# Patient Record
Sex: FEMALE | Race: WHITE | NOT HISPANIC OR LATINO | Employment: OTHER | URBAN - METROPOLITAN AREA
[De-identification: names, ages, dates, MRNs, and addresses within clinical notes are randomized per-mention and may not be internally consistent; named-entity substitution may affect disease eponyms.]

---

## 2023-02-22 LAB
ALANINE AMINOTRANSFERASE (SGPT) (U/L) IN SER/PLAS: 16 U/L (ref 7–45)
ALBUMIN (G/DL) IN SER/PLAS: 4.5 G/DL (ref 3.4–5)
ALKALINE PHOSPHATASE (U/L) IN SER/PLAS: 94 U/L (ref 33–136)
ANION GAP IN SER/PLAS: 12 MMOL/L (ref 10–20)
ASPARTATE AMINOTRANSFERASE (SGOT) (U/L) IN SER/PLAS: 23 U/L (ref 9–39)
BILIRUBIN TOTAL (MG/DL) IN SER/PLAS: 0.5 MG/DL (ref 0–1.2)
CALCIUM (MG/DL) IN SER/PLAS: 10.4 MG/DL (ref 8.6–10.3)
CARBON DIOXIDE, TOTAL (MMOL/L) IN SER/PLAS: 32 MMOL/L (ref 21–32)
CHLORIDE (MMOL/L) IN SER/PLAS: 99 MMOL/L (ref 98–107)
CHOLESTEROL (MG/DL) IN SER/PLAS: 163 MG/DL (ref 0–199)
CHOLESTEROL IN HDL (MG/DL) IN SER/PLAS: 66.7 MG/DL
CHOLESTEROL/HDL RATIO: 2.4
CREATININE (MG/DL) IN SER/PLAS: 0.98 MG/DL (ref 0.5–1.05)
ERYTHROCYTE DISTRIBUTION WIDTH (RATIO) BY AUTOMATED COUNT: 13.4 % (ref 11.5–14.5)
ERYTHROCYTE MEAN CORPUSCULAR HEMOGLOBIN CONCENTRATION (G/DL) BY AUTOMATED: 31.6 G/DL (ref 32–36)
ERYTHROCYTE MEAN CORPUSCULAR VOLUME (FL) BY AUTOMATED COUNT: 98 FL (ref 80–100)
ERYTHROCYTES (10*6/UL) IN BLOOD BY AUTOMATED COUNT: 4.41 X10E12/L (ref 4–5.2)
GFR FEMALE: 57 ML/MIN/1.73M2
GLUCOSE (MG/DL) IN SER/PLAS: 82 MG/DL (ref 74–99)
HEMATOCRIT (%) IN BLOOD BY AUTOMATED COUNT: 43 % (ref 36–46)
HEMOGLOBIN (G/DL) IN BLOOD: 13.6 G/DL (ref 12–16)
LDL: 80 MG/DL (ref 0–99)
LEUKOCYTES (10*3/UL) IN BLOOD BY AUTOMATED COUNT: 11.1 X10E9/L (ref 4.4–11.3)
PLATELETS (10*3/UL) IN BLOOD AUTOMATED COUNT: 343 X10E9/L (ref 150–450)
POTASSIUM (MMOL/L) IN SER/PLAS: 4.6 MMOL/L (ref 3.5–5.3)
PROTEIN TOTAL: 7.8 G/DL (ref 6.4–8.2)
SODIUM (MMOL/L) IN SER/PLAS: 138 MMOL/L (ref 136–145)
THYROTROPIN (MIU/L) IN SER/PLAS BY DETECTION LIMIT <= 0.05 MIU/L: 1.47 MIU/L (ref 0.44–3.98)
TRIGLYCERIDE (MG/DL) IN SER/PLAS: 84 MG/DL (ref 0–149)
UREA NITROGEN (MG/DL) IN SER/PLAS: 17 MG/DL (ref 6–23)
VLDL: 17 MG/DL (ref 0–40)

## 2023-02-26 PROBLEM — H02.88A MEIBOMIAN GLAND DYSFUNCTION (MGD) OF UPPER AND LOWER EYELID OF RIGHT EYE: Status: ACTIVE | Noted: 2023-02-26

## 2023-02-26 PROBLEM — Z96.1 BILATERAL PSEUDOPHAKIA: Status: ACTIVE | Noted: 2023-02-26

## 2023-02-26 PROBLEM — I10 WHITE COAT SYNDROME WITH HYPERTENSION: Status: ACTIVE | Noted: 2023-02-26

## 2023-02-26 PROBLEM — G47.19 EXCESSIVE DAYTIME SLEEPINESS: Status: ACTIVE | Noted: 2023-02-26

## 2023-02-26 PROBLEM — N18.31 ANEMIA IN STAGE 3A CHRONIC KIDNEY DISEASE (MULTI): Status: ACTIVE | Noted: 2023-02-26

## 2023-02-26 PROBLEM — D22.9 NEVUS: Status: ACTIVE | Noted: 2023-02-26

## 2023-02-26 PROBLEM — H35.371 EPIRETINAL MEMBRANE (ERM) OF RIGHT EYE: Status: ACTIVE | Noted: 2023-02-26

## 2023-02-26 PROBLEM — H90.3 BILATERAL SENSORINEURAL HEARING LOSS: Status: ACTIVE | Noted: 2023-02-26

## 2023-02-26 PROBLEM — H52.02 HYPERMETROPIA OF LEFT EYE: Status: ACTIVE | Noted: 2023-02-26

## 2023-02-26 PROBLEM — K13.70 ORAL MUCOSAL LESION: Status: ACTIVE | Noted: 2023-02-26

## 2023-02-26 PROBLEM — M85.80 OSTEOPENIA: Status: ACTIVE | Noted: 2023-02-26

## 2023-02-26 PROBLEM — D12.6 ADENOMATOUS POLYP OF COLON: Status: ACTIVE | Noted: 2023-02-26

## 2023-02-26 PROBLEM — H02.88B MEIBOMIAN GLAND DYSFUNCTION (MGD) OF UPPER AND LOWER EYELID OF LEFT EYE: Status: ACTIVE | Noted: 2023-02-26

## 2023-02-26 PROBLEM — H52.13 MYOPIA OF BOTH EYES: Status: ACTIVE | Noted: 2023-02-26

## 2023-02-26 PROBLEM — R63.4 WEIGHT LOSS: Status: ACTIVE | Noted: 2023-02-26

## 2023-02-26 PROBLEM — E03.9 HYPOTHYROIDISM: Status: ACTIVE | Noted: 2023-02-26

## 2023-02-26 PROBLEM — Z96.1 PSEUDOPHAKIA OF LEFT EYE: Status: ACTIVE | Noted: 2023-02-26

## 2023-02-26 PROBLEM — H91.91 HEARING LOSS OF RIGHT EAR: Status: ACTIVE | Noted: 2023-02-26

## 2023-02-26 PROBLEM — F41.8 DEPRESSION WITH ANXIETY: Status: ACTIVE | Noted: 2023-02-26

## 2023-02-26 PROBLEM — F17.200 NICOTINE DEPENDENCE, UNSPECIFIED, UNCOMPLICATED: Status: ACTIVE | Noted: 2023-02-26

## 2023-02-26 PROBLEM — D63.1 ANEMIA IN STAGE 3A CHRONIC KIDNEY DISEASE (MULTI): Status: ACTIVE | Noted: 2023-02-26

## 2023-02-26 PROBLEM — G31.84 MILD COGNITIVE IMPAIRMENT: Status: ACTIVE | Noted: 2023-02-26

## 2023-02-26 PROBLEM — H31.103 MYOPIC RETINOPATHY OF BOTH EYES: Status: ACTIVE | Noted: 2023-02-26

## 2023-02-26 PROBLEM — Z96.1 PSEUDOPHAKIA OF RIGHT EYE: Status: ACTIVE | Noted: 2023-02-26

## 2023-02-26 PROBLEM — H35.3131 EARLY DRY STAGE NONEXUDATIVE AGE-RELATED MACULAR DEGENERATION OF BOTH EYES: Status: ACTIVE | Noted: 2023-02-26

## 2023-02-26 RX ORDER — CALCIUM CARBONATE/VITAMIN D3 600MG-5MCG
2 TABLET ORAL DAILY
COMMUNITY
Start: 2013-03-12

## 2023-02-26 RX ORDER — LEVOTHYROXINE SODIUM 100 UG/1
100 TABLET ORAL DAILY
COMMUNITY
Start: 2013-03-12 | End: 2023-06-12

## 2023-02-26 RX ORDER — MIRTAZAPINE 30 MG/1
30 TABLET, FILM COATED ORAL NIGHTLY
COMMUNITY
End: 2023-08-28 | Stop reason: SDUPTHER

## 2023-02-26 RX ORDER — ASPIRIN 81 MG/1
81 TABLET ORAL DAILY
COMMUNITY
Start: 2013-03-12

## 2023-02-26 RX ORDER — DESOXIMETASONE 2.5 MG/G
1 OINTMENT TOPICAL 2 TIMES DAILY
COMMUNITY
Start: 2013-03-12

## 2023-03-20 ENCOUNTER — OFFICE VISIT (OUTPATIENT)
Dept: PRIMARY CARE | Facility: CLINIC | Age: 83
End: 2023-03-20
Payer: MEDICARE

## 2023-03-20 VITALS
SYSTOLIC BLOOD PRESSURE: 138 MMHG | WEIGHT: 100.4 LBS | HEART RATE: 91 BPM | TEMPERATURE: 96.9 F | OXYGEN SATURATION: 100 % | RESPIRATION RATE: 16 BRPM | DIASTOLIC BLOOD PRESSURE: 80 MMHG | BODY MASS INDEX: 16.08 KG/M2

## 2023-03-20 DIAGNOSIS — F41.8 DEPRESSION WITH ANXIETY: ICD-10-CM

## 2023-03-20 DIAGNOSIS — R63.4 WEIGHT LOSS: ICD-10-CM

## 2023-03-20 DIAGNOSIS — I10 WHITE COAT SYNDROME WITH HYPERTENSION: Primary | ICD-10-CM

## 2023-03-20 DIAGNOSIS — G31.84 MILD COGNITIVE IMPAIRMENT: ICD-10-CM

## 2023-03-20 DIAGNOSIS — N18.31 ANEMIA IN STAGE 3A CHRONIC KIDNEY DISEASE (MULTI): ICD-10-CM

## 2023-03-20 DIAGNOSIS — D63.1 ANEMIA IN STAGE 3A CHRONIC KIDNEY DISEASE (MULTI): ICD-10-CM

## 2023-03-20 DIAGNOSIS — E03.8 OTHER SPECIFIED HYPOTHYROIDISM: ICD-10-CM

## 2023-03-20 PROBLEM — G47.19 EXCESSIVE DAYTIME SLEEPINESS: Status: RESOLVED | Noted: 2023-02-26 | Resolved: 2023-03-20

## 2023-03-20 PROCEDURE — 3075F SYST BP GE 130 - 139MM HG: CPT | Performed by: INTERNAL MEDICINE

## 2023-03-20 PROCEDURE — 3079F DIAST BP 80-89 MM HG: CPT | Performed by: INTERNAL MEDICINE

## 2023-03-20 PROCEDURE — 99214 OFFICE O/P EST MOD 30 MIN: CPT | Performed by: INTERNAL MEDICINE

## 2023-03-20 PROCEDURE — 1159F MED LIST DOCD IN RCRD: CPT | Performed by: INTERNAL MEDICINE

## 2023-03-20 NOTE — PROGRESS NOTES
Subjective   Patient ID: Hafsa Wilson is a 82 y.o. female who presents for Follow-up.    Eating better  Has gained 7lbs           Review of Systems    Objective   /80   Pulse 91   Temp 36.1 °C (96.9 °F)   Resp 16   Wt 45.5 kg (100 lb 6.4 oz)   SpO2 100%   BMI 16.08 kg/m²     Physical Exam    Assessment/Plan   Problem List Items Addressed This Visit       Anemia in stage 3a chronic kidney disease     Stable         Depression with anxiety     On Mirtazapine         Hypothyroidism    Mild cognitive impairment    Weight loss     Improved         White coat syndrome with hypertension - Primary

## 2023-06-10 DIAGNOSIS — E03.9 ACQUIRED HYPOTHYROIDISM: Primary | ICD-10-CM

## 2023-06-12 RX ORDER — LEVOTHYROXINE SODIUM 100 UG/1
100 TABLET ORAL DAILY
Qty: 90 TABLET | Refills: 1 | Status: SHIPPED | OUTPATIENT
Start: 2023-06-12 | End: 2024-03-11 | Stop reason: SDUPTHER

## 2023-06-13 DIAGNOSIS — E03.9 ACQUIRED HYPOTHYROIDISM: ICD-10-CM

## 2023-06-22 ENCOUNTER — OFFICE VISIT (OUTPATIENT)
Dept: PRIMARY CARE | Facility: CLINIC | Age: 83
End: 2023-06-22
Payer: MEDICARE

## 2023-06-22 VITALS
SYSTOLIC BLOOD PRESSURE: 136 MMHG | HEART RATE: 63 BPM | DIASTOLIC BLOOD PRESSURE: 78 MMHG | WEIGHT: 98.8 LBS | TEMPERATURE: 97.6 F | BODY MASS INDEX: 15.83 KG/M2 | RESPIRATION RATE: 16 BRPM | OXYGEN SATURATION: 97 %

## 2023-06-22 DIAGNOSIS — F41.8 DEPRESSION WITH ANXIETY: ICD-10-CM

## 2023-06-22 DIAGNOSIS — E03.9 ACQUIRED HYPOTHYROIDISM: ICD-10-CM

## 2023-06-22 DIAGNOSIS — I10 WHITE COAT SYNDROME WITH HYPERTENSION: Primary | ICD-10-CM

## 2023-06-22 PROCEDURE — 1159F MED LIST DOCD IN RCRD: CPT | Performed by: INTERNAL MEDICINE

## 2023-06-22 PROCEDURE — 3075F SYST BP GE 130 - 139MM HG: CPT | Performed by: INTERNAL MEDICINE

## 2023-06-22 PROCEDURE — 3078F DIAST BP <80 MM HG: CPT | Performed by: INTERNAL MEDICINE

## 2023-06-22 PROCEDURE — 99214 OFFICE O/P EST MOD 30 MIN: CPT | Performed by: INTERNAL MEDICINE

## 2023-06-22 NOTE — PROGRESS NOTES
Subjective   Patient ID: Hafsa Wilson is a 83 y.o. female who presents for Follow-up.  Here for follow up  Feels very down daily from early afternoon to lat night  Gets up at 5am and feels good   But mid afternoon she is tired  Not going to activities in her community    Mirtazapine helps her sleep - takes it at 10pm          Review of Systems    Objective   Physical Exam    Assessment/Plan   Problem List Items Addressed This Visit       Depression with anxiety    Current Assessment & Plan     Take Mirtazaipine at 7pm  Add in a walk at 5pm  Encouraged participation in more activities           Hypothyroidism    Current Assessment & Plan     Stable         White coat syndrome with hypertension - Primary    Current Assessment & Plan     Stable         Follow up with me in 3 months

## 2023-08-28 DIAGNOSIS — F41.8 DEPRESSION WITH ANXIETY: ICD-10-CM

## 2023-08-28 RX ORDER — MIRTAZAPINE 30 MG/1
30 TABLET, FILM COATED ORAL NIGHTLY
Qty: 90 TABLET | Refills: 1 | Status: SHIPPED | OUTPATIENT
Start: 2023-08-28 | End: 2023-11-20 | Stop reason: SDUPTHER

## 2023-10-02 ENCOUNTER — OFFICE VISIT (OUTPATIENT)
Dept: PRIMARY CARE | Facility: CLINIC | Age: 83
End: 2023-10-02
Payer: MEDICARE

## 2023-10-02 VITALS
SYSTOLIC BLOOD PRESSURE: 142 MMHG | OXYGEN SATURATION: 98 % | DIASTOLIC BLOOD PRESSURE: 86 MMHG | WEIGHT: 102 LBS | TEMPERATURE: 97.8 F | RESPIRATION RATE: 16 BRPM | HEART RATE: 61 BPM | BODY MASS INDEX: 16.34 KG/M2

## 2023-10-02 DIAGNOSIS — E03.9 ACQUIRED HYPOTHYROIDISM: ICD-10-CM

## 2023-10-02 DIAGNOSIS — F41.8 DEPRESSION WITH ANXIETY: Primary | ICD-10-CM

## 2023-10-02 DIAGNOSIS — G31.84 MILD COGNITIVE IMPAIRMENT: ICD-10-CM

## 2023-10-02 DIAGNOSIS — Z23 ENCOUNTER FOR IMMUNIZATION: ICD-10-CM

## 2023-10-02 DIAGNOSIS — M70.22 OLECRANON BURSITIS OF LEFT ELBOW: ICD-10-CM

## 2023-10-02 PROCEDURE — 99214 OFFICE O/P EST MOD 30 MIN: CPT | Performed by: INTERNAL MEDICINE

## 2023-10-02 PROCEDURE — 1159F MED LIST DOCD IN RCRD: CPT | Performed by: INTERNAL MEDICINE

## 2023-10-02 PROCEDURE — 3077F SYST BP >= 140 MM HG: CPT | Performed by: INTERNAL MEDICINE

## 2023-10-02 PROCEDURE — G0008 ADMIN INFLUENZA VIRUS VAC: HCPCS | Performed by: INTERNAL MEDICINE

## 2023-10-02 PROCEDURE — 1160F RVW MEDS BY RX/DR IN RCRD: CPT | Performed by: INTERNAL MEDICINE

## 2023-10-02 PROCEDURE — 90662 IIV NO PRSV INCREASED AG IM: CPT | Performed by: INTERNAL MEDICINE

## 2023-10-02 PROCEDURE — 3079F DIAST BP 80-89 MM HG: CPT | Performed by: INTERNAL MEDICINE

## 2023-10-02 ASSESSMENT — ENCOUNTER SYMPTOMS
SHORTNESS OF BREATH: 0
COUGH: 0
ACTIVITY CHANGE: 0
PALPITATIONS: 0
APPETITE CHANGE: 0

## 2023-10-02 NOTE — ASSESSMENT & PLAN NOTE
Referred to Dr Varma  Continue Mirtazapine for now    We have agreed that twice a week she will connect with a friend and twice a week she should go to a class or activity

## 2023-10-02 NOTE — PROGRESS NOTES
"The patient is here today for a follow up appointment.Subjective   Patient ID: Hafsa Wilson is a 83 y.o. female who presents for Follow-up.    Noticed swelling over the tip of elbow    She does not think the Mirtazapine is helping enough- feels an overall glumness  \"I am tired of living\"   Not social  Not participating in the activities  Always tired          Review of Systems   Constitutional:  Negative for activity change and appetite change.   Respiratory:  Negative for cough and shortness of breath.    Cardiovascular:  Negative for chest pain, palpitations and leg swelling.       Objective   Physical Exam  Vitals and nursing note reviewed.   Cardiovascular:      Rate and Rhythm: Normal rate and regular rhythm.   Pulmonary:      Effort: Pulmonary effort is normal.      Breath sounds: Normal breath sounds.         Assessment/Plan   Problem List Items Addressed This Visit       Depression with anxiety - Primary    Current Assessment & Plan     Referred to Dr Varma  Continue Mirtazapine for now         Hypothyroidism    Current Assessment & Plan     Stable         Mild cognitive impairment     Other Visit Diagnoses       Encounter for immunization        Relevant Orders    Flu vaccine, quadrivalent, high-dose, preservative free, age 65y+ (FLUZONE) (Completed)         Follow up with me in Feb for a Wellness visit  "

## 2023-10-25 ENCOUNTER — OFFICE VISIT (OUTPATIENT)
Dept: OPHTHALMOLOGY | Facility: CLINIC | Age: 83
End: 2023-10-25
Payer: MEDICARE

## 2023-10-25 DIAGNOSIS — H35.30 ARMD (AGE-RELATED MACULAR DEGENERATION), BILATERAL: Primary | ICD-10-CM

## 2023-10-25 PROCEDURE — 92134 CPTRZ OPH DX IMG PST SGM RTA: CPT | Mod: BILATERAL PROCEDURE | Performed by: OPHTHALMOLOGY

## 2023-10-25 PROCEDURE — 99213 OFFICE O/P EST LOW 20 MIN: CPT | Performed by: OPHTHALMOLOGY

## 2023-10-25 ASSESSMENT — VISUAL ACUITY
OS_CC+: -1
METHOD: SNELLEN - LINEAR
OD_CC: 20/30
CORRECTION_TYPE: GLASSES
OS_CC: 20/30

## 2023-10-25 ASSESSMENT — REFRACTION_WEARINGRX
OS_CYLINDER: -0.50
OD_CYLINDER: -0.50
OS_ADD: +2.50
OS_SPHERE: +0.50
OS_AXIS: 070
OD_ADD: +2.50
OD_SPHERE: -1.00
OD_AXIS: 095

## 2023-10-25 ASSESSMENT — CONF VISUAL FIELD
OS_SUPERIOR_TEMPORAL_RESTRICTION: 0
OS_INFERIOR_TEMPORAL_RESTRICTION: 0
OS_NORMAL: 1
OD_SUPERIOR_TEMPORAL_RESTRICTION: 0
OD_INFERIOR_TEMPORAL_RESTRICTION: 0
OS_SUPERIOR_NASAL_RESTRICTION: 0
OD_INFERIOR_NASAL_RESTRICTION: 0
OD_SUPERIOR_NASAL_RESTRICTION: 0
OS_INFERIOR_NASAL_RESTRICTION: 0
OD_NORMAL: 1

## 2023-10-25 ASSESSMENT — ENCOUNTER SYMPTOMS
NEUROLOGICAL NEGATIVE: 0
EYES NEGATIVE: 1
PSYCHIATRIC NEGATIVE: 0
ENDOCRINE NEGATIVE: 0
RESPIRATORY NEGATIVE: 0
GASTROINTESTINAL NEGATIVE: 0
MUSCULOSKELETAL NEGATIVE: 1
CARDIOVASCULAR NEGATIVE: 0
CONSTITUTIONAL NEGATIVE: 0
ALLERGIC/IMMUNOLOGIC NEGATIVE: 0
HEMATOLOGIC/LYMPHATIC NEGATIVE: 0

## 2023-10-25 ASSESSMENT — TONOMETRY
OS_IOP_MMHG: 17
OD_IOP_MMHG: 18
IOP_METHOD: GOLDMANN APPLANATION

## 2023-10-25 NOTE — PROGRESS NOTES
Impression    1 T85.22XA Dislocated intraocular lens-Stable  2 H35.89 Macular Rpe mottling-Stable  3 H35.371 Epiretinal membrane (erm) of right eye-Stable  4 H52.223 Regular astigmatism, bilateral-Stable  5 H52.02 Hypermetropia of left eye-Stable  6 H52.4 Presbyopia-Stable  7 H52.13 Myopic retinopathy of both eyes-Stable          Discussion    Dislocated intraocular lensT85.22XA  the lens is very slightly of axis  this should be correctable lens  no surgery needed at this point         Macular RPE [id number]  Early dry stage nonexudative age-related macular degeneration of both eyesH35.3131  Mild. not vs. Discussed imporantance of sun protection outdoors, not smoking, healthy diet and exercise. Monitor.    Regular astigmatism, [id number]  Hypermetropia of left eyeH52.02       for now watch for CME  or other sec complications no surgery advised    None today

## 2023-11-13 ENCOUNTER — TELEPHONE (OUTPATIENT)
Dept: PRIMARY CARE | Facility: CLINIC | Age: 83
End: 2023-11-13
Payer: MEDICARE

## 2023-11-13 NOTE — TELEPHONE ENCOUNTER
Pt would like to talk to Dr. Weiss, she advised she is dizzy when she sits up and stands and didn't know if she possibly has vertigo. Please call

## 2023-11-13 NOTE — TELEPHONE ENCOUNTER
DW pt  C/o 2 days of dizziness  Worse when goes from sitting to standing   Not driving  Not on any BP meds  Recommend improve hydration to 32 ounces of water per day and liberalize salt in diet  Call back in 3-5 days if no better

## 2023-11-16 ENCOUNTER — TELEPHONE (OUTPATIENT)
Dept: PRIMARY CARE | Facility: CLINIC | Age: 83
End: 2023-11-16
Payer: MEDICARE

## 2023-11-16 ENCOUNTER — APPOINTMENT (OUTPATIENT)
Dept: RADIOLOGY | Facility: HOSPITAL | Age: 83
End: 2023-11-16
Payer: MEDICARE

## 2023-11-16 ENCOUNTER — HOSPITAL ENCOUNTER (EMERGENCY)
Facility: HOSPITAL | Age: 83
Discharge: HOME | End: 2023-11-16
Attending: STUDENT IN AN ORGANIZED HEALTH CARE EDUCATION/TRAINING PROGRAM
Payer: MEDICARE

## 2023-11-16 ENCOUNTER — APPOINTMENT (OUTPATIENT)
Dept: CARDIOLOGY | Facility: HOSPITAL | Age: 83
End: 2023-11-16
Payer: MEDICARE

## 2023-11-16 VITALS
HEIGHT: 66 IN | HEART RATE: 66 BPM | BODY MASS INDEX: 16.88 KG/M2 | WEIGHT: 105 LBS | SYSTOLIC BLOOD PRESSURE: 185 MMHG | TEMPERATURE: 98.4 F | RESPIRATION RATE: 18 BRPM | DIASTOLIC BLOOD PRESSURE: 76 MMHG | OXYGEN SATURATION: 98 %

## 2023-11-16 DIAGNOSIS — R42 VERTIGO: Primary | ICD-10-CM

## 2023-11-16 LAB
ALBUMIN SERPL BCP-MCNC: 4 G/DL (ref 3.4–5)
ALP SERPL-CCNC: 81 U/L (ref 33–136)
ALT SERPL W P-5'-P-CCNC: 7 U/L (ref 7–45)
ANION GAP SERPL CALC-SCNC: 10 MMOL/L (ref 10–20)
APPEARANCE UR: CLEAR
AST SERPL W P-5'-P-CCNC: 18 U/L (ref 9–39)
BACTERIA #/AREA URNS AUTO: ABNORMAL /HPF
BASOPHILS # BLD AUTO: 0.06 X10*3/UL (ref 0–0.1)
BASOPHILS NFR BLD AUTO: 0.7 %
BILIRUB SERPL-MCNC: 0.3 MG/DL (ref 0–1.2)
BILIRUB UR STRIP.AUTO-MCNC: NEGATIVE MG/DL
BUN SERPL-MCNC: 10 MG/DL (ref 6–23)
CALCIUM SERPL-MCNC: 9.4 MG/DL (ref 8.6–10.3)
CARDIAC TROPONIN I PNL SERPL HS: 6 NG/L (ref 0–13)
CARDIAC TROPONIN I PNL SERPL HS: 7 NG/L (ref 0–13)
CHLORIDE SERPL-SCNC: 103 MMOL/L (ref 98–107)
CO2 SERPL-SCNC: 28 MMOL/L (ref 21–32)
COLOR UR: YELLOW
CREAT SERPL-MCNC: 0.91 MG/DL (ref 0.5–1.05)
EOSINOPHIL # BLD AUTO: 0.2 X10*3/UL (ref 0–0.4)
EOSINOPHIL NFR BLD AUTO: 2.5 %
ERYTHROCYTE [DISTWIDTH] IN BLOOD BY AUTOMATED COUNT: 13 % (ref 11.5–14.5)
FLUAV RNA RESP QL NAA+PROBE: NOT DETECTED
FLUBV RNA RESP QL NAA+PROBE: NOT DETECTED
GFR SERPL CREATININE-BSD FRML MDRD: 63 ML/MIN/1.73M*2
GLUCOSE SERPL-MCNC: 90 MG/DL (ref 74–99)
GLUCOSE UR STRIP.AUTO-MCNC: NEGATIVE MG/DL
HCT VFR BLD AUTO: 42.1 % (ref 36–46)
HGB BLD-MCNC: 13.7 G/DL (ref 12–16)
HOLD SPECIMEN: NORMAL
IMM GRANULOCYTES # BLD AUTO: 0.03 X10*3/UL (ref 0–0.5)
IMM GRANULOCYTES NFR BLD AUTO: 0.4 % (ref 0–0.9)
KETONES UR STRIP.AUTO-MCNC: NEGATIVE MG/DL
LEUKOCYTE ESTERASE UR QL STRIP.AUTO: ABNORMAL
LYMPHOCYTES # BLD AUTO: 2.21 X10*3/UL (ref 0.8–3)
LYMPHOCYTES NFR BLD AUTO: 27.1 %
MCH RBC QN AUTO: 31.1 PG (ref 26–34)
MCHC RBC AUTO-ENTMCNC: 32.5 G/DL (ref 32–36)
MCV RBC AUTO: 96 FL (ref 80–100)
MONOCYTES # BLD AUTO: 0.66 X10*3/UL (ref 0.05–0.8)
MONOCYTES NFR BLD AUTO: 8.1 %
MUCOUS THREADS #/AREA URNS AUTO: ABNORMAL /LPF
NEUTROPHILS # BLD AUTO: 4.98 X10*3/UL (ref 1.6–5.5)
NEUTROPHILS NFR BLD AUTO: 61.2 %
NITRITE UR QL STRIP.AUTO: NEGATIVE
NRBC BLD-RTO: 0 /100 WBCS (ref 0–0)
PH UR STRIP.AUTO: 5 [PH]
PLATELET # BLD AUTO: 281 X10*3/UL (ref 150–450)
POTASSIUM SERPL-SCNC: 3.8 MMOL/L (ref 3.5–5.3)
PROT SERPL-MCNC: 7.1 G/DL (ref 6.4–8.2)
PROT UR STRIP.AUTO-MCNC: NEGATIVE MG/DL
RBC # BLD AUTO: 4.41 X10*6/UL (ref 4–5.2)
RBC # UR STRIP.AUTO: NEGATIVE /UL
RBC #/AREA URNS AUTO: ABNORMAL /HPF
SARS-COV-2 RNA RESP QL NAA+PROBE: NOT DETECTED
SODIUM SERPL-SCNC: 137 MMOL/L (ref 136–145)
SP GR UR STRIP.AUTO: 1.01
TSH SERPL-ACNC: 1.74 MIU/L (ref 0.44–3.98)
UROBILINOGEN UR STRIP.AUTO-MCNC: <2 MG/DL
WBC # BLD AUTO: 8.1 X10*3/UL (ref 4.4–11.3)
WBC #/AREA URNS AUTO: ABNORMAL /HPF

## 2023-11-16 PROCEDURE — 87086 URINE CULTURE/COLONY COUNT: CPT | Mod: ELYLAB | Performed by: STUDENT IN AN ORGANIZED HEALTH CARE EDUCATION/TRAINING PROGRAM

## 2023-11-16 PROCEDURE — 85025 COMPLETE CBC W/AUTO DIFF WBC: CPT | Performed by: STUDENT IN AN ORGANIZED HEALTH CARE EDUCATION/TRAINING PROGRAM

## 2023-11-16 PROCEDURE — 71045 X-RAY EXAM CHEST 1 VIEW: CPT | Mod: FR

## 2023-11-16 PROCEDURE — 93005 ELECTROCARDIOGRAM TRACING: CPT

## 2023-11-16 PROCEDURE — 36415 COLL VENOUS BLD VENIPUNCTURE: CPT | Performed by: STUDENT IN AN ORGANIZED HEALTH CARE EDUCATION/TRAINING PROGRAM

## 2023-11-16 PROCEDURE — 2500000001 HC RX 250 WO HCPCS SELF ADMINISTERED DRUGS (ALT 637 FOR MEDICARE OP): Performed by: STUDENT IN AN ORGANIZED HEALTH CARE EDUCATION/TRAINING PROGRAM

## 2023-11-16 PROCEDURE — 99285 EMERGENCY DEPT VISIT HI MDM: CPT | Mod: 25 | Performed by: STUDENT IN AN ORGANIZED HEALTH CARE EDUCATION/TRAINING PROGRAM

## 2023-11-16 PROCEDURE — 80053 COMPREHEN METABOLIC PANEL: CPT | Performed by: STUDENT IN AN ORGANIZED HEALTH CARE EDUCATION/TRAINING PROGRAM

## 2023-11-16 PROCEDURE — 70450 CT HEAD/BRAIN W/O DYE: CPT

## 2023-11-16 PROCEDURE — 84484 ASSAY OF TROPONIN QUANT: CPT | Performed by: STUDENT IN AN ORGANIZED HEALTH CARE EDUCATION/TRAINING PROGRAM

## 2023-11-16 PROCEDURE — 81001 URINALYSIS AUTO W/SCOPE: CPT | Performed by: STUDENT IN AN ORGANIZED HEALTH CARE EDUCATION/TRAINING PROGRAM

## 2023-11-16 PROCEDURE — 70450 CT HEAD/BRAIN W/O DYE: CPT | Performed by: RADIOLOGY

## 2023-11-16 PROCEDURE — 84443 ASSAY THYROID STIM HORMONE: CPT | Performed by: STUDENT IN AN ORGANIZED HEALTH CARE EDUCATION/TRAINING PROGRAM

## 2023-11-16 PROCEDURE — 71045 X-RAY EXAM CHEST 1 VIEW: CPT | Mod: FOREIGN READ | Performed by: RADIOLOGY

## 2023-11-16 PROCEDURE — 87636 SARSCOV2 & INF A&B AMP PRB: CPT | Performed by: STUDENT IN AN ORGANIZED HEALTH CARE EDUCATION/TRAINING PROGRAM

## 2023-11-16 PROCEDURE — 94760 N-INVAS EAR/PLS OXIMETRY 1: CPT

## 2023-11-16 RX ORDER — MECLIZINE HCL 12.5 MG 12.5 MG/1
25 TABLET ORAL 2 TIMES DAILY
Qty: 20 TABLET | Refills: 0 | Status: SHIPPED | OUTPATIENT
Start: 2023-11-16 | End: 2023-11-21

## 2023-11-16 RX ORDER — MECLIZINE HCL 12.5 MG 12.5 MG/1
25 TABLET ORAL ONCE
Status: COMPLETED | OUTPATIENT
Start: 2023-11-16 | End: 2023-11-16

## 2023-11-16 RX ADMIN — MECLIZINE HCL 12.5 MG 25 MG: 12.5 TABLET ORAL at 18:11

## 2023-11-16 ASSESSMENT — LIFESTYLE VARIABLES
REASON UNABLE TO ASSESS: NO
HAVE PEOPLE ANNOYED YOU BY CRITICIZING YOUR DRINKING: NO
EVER FELT BAD OR GUILTY ABOUT YOUR DRINKING: NO
EVER HAD A DRINK FIRST THING IN THE MORNING TO STEADY YOUR NERVES TO GET RID OF A HANGOVER: NO
HAVE YOU EVER FELT YOU SHOULD CUT DOWN ON YOUR DRINKING: NO

## 2023-11-16 ASSESSMENT — COLUMBIA-SUICIDE SEVERITY RATING SCALE - C-SSRS
2. HAVE YOU ACTUALLY HAD ANY THOUGHTS OF KILLING YOURSELF?: NO
6. HAVE YOU EVER DONE ANYTHING, STARTED TO DO ANYTHING, OR PREPARED TO DO ANYTHING TO END YOUR LIFE?: NO
1. IN THE PAST MONTH, HAVE YOU WISHED YOU WERE DEAD OR WISHED YOU COULD GO TO SLEEP AND NOT WAKE UP?: NO

## 2023-11-16 ASSESSMENT — PAIN SCALES - GENERAL
PAINLEVEL_OUTOF10: 0 - NO PAIN
PAINLEVEL_OUTOF10: 0 - NO PAIN

## 2023-11-16 ASSESSMENT — PAIN - FUNCTIONAL ASSESSMENT: PAIN_FUNCTIONAL_ASSESSMENT: 0-10

## 2023-11-16 NOTE — TELEPHONE ENCOUNTER
Pt of yours called in stating she has been feeling very dizzy/off balance and dehydrated. States she spoke with you earlier this week in regards to this. At this time her daughter is home with her and she could possibly make an appt. Please advise. Thank you!

## 2023-11-16 NOTE — ED TRIAGE NOTES
PT. ARRIVED VIA PRIVATE CAR, RIDE PROVIDED, TO ED FROM HOME FOR DIZZINESS X3DAYS. PT. STATES NOTICING DIZZINESS W/ FATIGUE X3DAYS. PT. STATES SHE CALLED PCP AND WAS ASKED TO COME TO ED. PT. STATES SHE HASN'T BEEN EATING WELL. PT. NEURO WNL, DENIES CP, SOB, N/V/D, HA, NUMBNESS/TINGLING.

## 2023-11-16 NOTE — TELEPHONE ENCOUNTER
Spoke with Hafsa and her daughter Serenity, advised them of the message and to go to an ER for the best plan of care. They stated they would go to [facility]. Serenity can be reached at [phone] and she stated she understood to call back to follow up.

## 2023-11-16 NOTE — ED PROVIDER NOTES
HPI   Chief Complaint   Patient presents with    Dizziness     X3DAYS W/ FATIGUE       Patient is a an 83-year-old female with history of CKD, hypothyroid who presents for dizziness and fatigue.  Patient states it has been going on for about 3 days.  Patient been having off-and-on dizziness, worse when she moves her head and stands up.  Denies chest pain, shortness of breath, fevers, chills, cough, nose, sore throat, vomiting, diarrhea, abdominal pain, dysuria, hematuria.  States she has been urinating more frequently which she attributes to drinking more water.  Denies any history of vertigo or similar episodes, LOC, anticoagulation use, ear pain, ringing in her ears.  States she has had decreased taste in the last several days as well, though just for coffee.                          [place] Coma Scale Score: 15         NIH Stroke Scale: 0          Patient History   Past Medical History:   Diagnosis Date    Abdominal distension (gaseous) 03/12/2013    Abdominal distention    Contusion of left lower leg, initial encounter 07/30/2015    Contusion of leg, left    Displacement of intraocular lens, initial encounter 10/17/2022    Dislocated intraocular lens    Encounter for examination of eyes and vision without abnormal findings     Encounter for ophthalmic examination and evaluation    Encounter for gynecological examination (general) (routine) without abnormal findings     Encounter for gynecological examination without abnormal finding    Encounter for screening for cardiovascular disorders     Encounter for screening for cardiovascular disorders    Epiretinal membrane (ERM) of right eye     Impacted cerumen, right ear 04/16/2021    Impacted cerumen of right ear    Ingrowing nail 04/26/2021    Ingrown toenail    Macular RPE mottling     Nail dystrophy 07/25/2018    Dystrophic nail    Other conditions influencing health status     X-Ray    Other conditions influencing health status 11/08/2013    Foot pain,  unspecified laterality    Other specified anxiety disorders     Depression with anxiety    Pain in left toe(s) 04/26/2021    Chronic pain of toe of left foot    Pain in unspecified knee     Joint pain, knee    Palpitations     Palpitations    Personal history of other diseases of the circulatory system     History of hypertension    Personal history of other diseases of the musculoskeletal system and connective tissue 09/16/2021    History of neck pain    Personal history of other diseases of the musculoskeletal system and connective tissue     History of osteopenia    Personal history of other diseases of the respiratory system 09/21/2015    History of acute bronchitis    Personal history of other endocrine, nutritional and metabolic disease     History of hypothyroidism    Personal history of other infectious and parasitic diseases     History of herpes zoster    Personal history of other infectious and parasitic diseases 04/26/2021    History of onychomycosis    Personal history of other specified conditions     History of epistaxis    Personal history of other specified conditions     History of fatigue    Personal history of other specified conditions 11/01/2018    History of breast lump    Rash and other nonspecific skin eruption 09/04/2018    Rash    Supraventricular tachycardia     Supraventricular tachycardia    Syncope and collapse 03/08/2017    Syncope and collapse    Toxic effect of venom of bees, accidental (unintentional), initial encounter 07/21/2018    Bee sting    Unspecified abnormal findings in urine     Malodorous urine     Past Surgical History:   Procedure Laterality Date    CATARACT EXTRACTION      COLONOSCOPY  12/20/2012    Complete Colonoscopy    OTHER SURGICAL HISTORY  12/20/2012    Superficial Cauterization Of Turbinate Mucosa     Family History   Problem Relation Name Age of Onset    Hyperlipidemia Mother      Macular degeneration Mother      Other (Cardiac disorder) Father      Heart  attack Father       Social History     Tobacco Use    Smoking status: Every Day     Packs/day: .25     Types: Cigarettes    Smokeless tobacco: Never   Vaping Use    Vaping Use: Never used   Substance Use Topics    Alcohol use: Yes     Comment: rare    Drug use: Never       Physical Exam   ED Triage Vitals [11/16/23 1634]   Temp Heart Rate Resp BP   36.9 °C (98.4 °F) 68 18 171/79      SpO2 Temp Source Heart Rate Source Patient Position   98 % Temporal Monitor Sitting      BP Location FiO2 (%)     Right arm --       Physical Exam  Constitutional:       General: She is not in acute distress.  HENT:      Head: Normocephalic.   Eyes:      Extraocular Movements: Extraocular movements intact.      Conjunctiva/sclera: Conjunctivae normal.      Pupils: Pupils are equal, round, and reactive to light.   Cardiovascular:      Rate and Rhythm: Normal rate and regular rhythm.      Pulses: Normal pulses.      Heart sounds: Normal heart sounds.   Pulmonary:      Effort: Pulmonary effort is normal.      Breath sounds: Normal breath sounds.   Abdominal:      General: There is no distension.      Palpations: Abdomen is soft. There is no mass.      Tenderness: There is no abdominal tenderness. There is no guarding.   Musculoskeletal:         General: No deformity.      Cervical back: Normal range of motion and neck supple.      Right lower leg: No edema.      Left lower leg: No edema.   Skin:     General: Skin is warm and dry.      Findings: No lesion or rash.   Neurological:      General: No focal deficit present.      Mental Status: She is alert and oriented to person, place, and time. Mental status is at baseline.      Cranial Nerves: No cranial nerve deficit.      Sensory: No sensory deficit.      Motor: No weakness.      Comments: CN 2-12 intact, ambulates without issue   Psychiatric:         Mood and Affect: Mood normal.         ED Course & MDM   Diagnoses as of 11/16/23 1949   Vertigo       Medical Decision Making  EKG on  interpretation: Rate 61, rhythm regular, axis rightward, , , QTc 430, T waves: Unremarkable, ST segments: No elevations or depressions, to rotation: Normal sinus rhythm, no STEMI    EKG on my interpretation: Rate 63, rhythm regular, axis rightward, , , QTc 454, T waves: Biphasic in V3, ST segments: No elevations or depressions, interpretation: Normal sinus rhythm, no STEMI    Patient is an 83-year-old female with above-stated past medical history presents for dizziness.  Patient's laboratory salts are unremarkable.  She has nonischemic EKGs and 2 negative troponins, low suspicion for ACS.  Patient has no chest pain or shortness of breath, low suspicion for pulmonary embolism as cause for her symptoms.  Patient is no electrolyte derangements.  Patient's urinalysis does not show signs of urinary tract infection.  Chest x-ray does not show signs of pneumonia or pneumothorax.  CT head did not show acute findings.  On reevaluation, patient states her symptoms are improved with meclizine.  She continues to state that her symptoms are only occurring when she moves or turns her head or stands up.  I have low clinical suspicion for central cause to her symptoms.  Patient appears clinically well and nontoxic.  I discussed the risk benefits of admission versus discharge home with meclizine with the patient and she is amenable discharge home.  I believe this is reasonable.  I gave the patient strict return precautions.  She stated understanding agreement.  Patient was discharged in stable condition.  I believe patient's symptoms are likely due to a peripheral cause given her improvement with meclizine and the description of her symptoms as well as her physical exam and work-up.    Disclaimer: This note was dictated using speech recognition software. Minor errors in transcription may be present. Please call if questions.     Julius Hennessy MD  [facility]  Contact on [facility]  [place]        Problems Addressed:  Vertigo: acute illness or injury    Amount and/or Complexity of Data Reviewed  Labs: ordered.  Radiology: ordered.  ECG/medicine tests: ordered and independent interpretation performed.        Procedure  Procedures     Julius Hennessy MD  11/16/23 1955

## 2023-11-17 LAB — BACTERIA UR CULT: NORMAL

## 2023-11-20 DIAGNOSIS — F41.8 DEPRESSION WITH ANXIETY: ICD-10-CM

## 2023-11-20 RX ORDER — MIRTAZAPINE 30 MG/1
30 TABLET, FILM COATED ORAL NIGHTLY
Qty: 90 TABLET | Refills: 1 | Status: SHIPPED | OUTPATIENT
Start: 2023-11-20 | End: 2024-02-20 | Stop reason: DRUGHIGH

## 2023-11-22 ENCOUNTER — OFFICE VISIT (OUTPATIENT)
Dept: PRIMARY CARE | Facility: CLINIC | Age: 83
End: 2023-11-22
Payer: MEDICARE

## 2023-11-22 VITALS
BODY MASS INDEX: 15.98 KG/M2 | HEART RATE: 62 BPM | WEIGHT: 99.4 LBS | DIASTOLIC BLOOD PRESSURE: 76 MMHG | SYSTOLIC BLOOD PRESSURE: 128 MMHG | OXYGEN SATURATION: 98 % | HEIGHT: 66 IN | TEMPERATURE: 98 F

## 2023-11-22 DIAGNOSIS — F41.8 DEPRESSION WITH ANXIETY: ICD-10-CM

## 2023-11-22 DIAGNOSIS — R63.4 WEIGHT LOSS: ICD-10-CM

## 2023-11-22 DIAGNOSIS — I10 WHITE COAT SYNDROME WITH HYPERTENSION: ICD-10-CM

## 2023-11-22 DIAGNOSIS — R42 VERTIGO: Primary | ICD-10-CM

## 2023-11-22 PROCEDURE — 1160F RVW MEDS BY RX/DR IN RCRD: CPT | Performed by: INTERNAL MEDICINE

## 2023-11-22 PROCEDURE — 1159F MED LIST DOCD IN RCRD: CPT | Performed by: INTERNAL MEDICINE

## 2023-11-22 PROCEDURE — 99214 OFFICE O/P EST MOD 30 MIN: CPT | Performed by: INTERNAL MEDICINE

## 2023-11-22 PROCEDURE — 1126F AMNT PAIN NOTED NONE PRSNT: CPT | Performed by: INTERNAL MEDICINE

## 2023-11-22 PROCEDURE — 3078F DIAST BP <80 MM HG: CPT | Performed by: INTERNAL MEDICINE

## 2023-11-22 PROCEDURE — 3074F SYST BP LT 130 MM HG: CPT | Performed by: INTERNAL MEDICINE

## 2023-11-22 RX ORDER — METHYLPREDNISOLONE 4 MG/1
TABLET ORAL
Qty: 21 TABLET | Refills: 0 | Status: SHIPPED | OUTPATIENT
Start: 2023-11-22 | End: 2023-12-06 | Stop reason: ALTCHOICE

## 2023-11-22 NOTE — PROGRESS NOTES
"Subjective   Patient ID: Hafsa Wilson is a 83 y.o. female who presents for ER Follow-up.    Patient presents today following up after being seen at [facility] ER on 11/16/2023.   Was feeling dizzy - \"feels faint\"- the room was tilting  Has not passed out with this  Sometimes eating helps her  Her daughter cooks for her but she eats VERY small meals      Patient had Labs, EKG, CT head and Chest xray completed during ER visit.     Patient would like to discuss the COVID and RSV vaccinations.            Review of Systems   Constitutional: Negative.    HENT: Negative.     Respiratory: Negative.     Cardiovascular: Negative.    Gastrointestinal: Negative.    Genitourinary: Negative.    Musculoskeletal: Negative.        Objective   /76 (BP Location: Left arm, Patient Position: Sitting)   Pulse 62   Temp 36.7 °C (98 °F)   Ht 1.676 m (5' 6\")   Wt 45.1 kg (99 lb 6.4 oz)   SpO2 98%   BMI 16.04 kg/m²     Physical Exam  Vitals and nursing note reviewed.   Cardiovascular:      Rate and Rhythm: Normal rate and regular rhythm.   Pulmonary:      Effort: Pulmonary effort is normal.      Breath sounds: Normal breath sounds.   Neurological:      Comments: Bilateral lateral guaze nystagmus         Assessment/Plan   Problem List Items Addressed This Visit             ICD-10-CM    Depression with anxiety F41.8    Weight loss R63.4     We have discussed increasing calorie intake at length, daughter is going to help her with this.           White coat syndrome with hypertension I10     Other Visit Diagnoses         Codes    Vertigo    -  Primary R42    Relevant Medications    methylPREDNISolone (Medrol Dospak) 4 mg tablets               "

## 2023-11-27 ASSESSMENT — ENCOUNTER SYMPTOMS
RESPIRATORY NEGATIVE: 1
MUSCULOSKELETAL NEGATIVE: 1
CONSTITUTIONAL NEGATIVE: 1
CARDIOVASCULAR NEGATIVE: 1
GASTROINTESTINAL NEGATIVE: 1

## 2023-12-06 ENCOUNTER — OFFICE VISIT (OUTPATIENT)
Dept: PRIMARY CARE | Facility: CLINIC | Age: 83
End: 2023-12-06
Payer: MEDICARE

## 2023-12-06 VITALS
HEIGHT: 66 IN | BODY MASS INDEX: 16.36 KG/M2 | DIASTOLIC BLOOD PRESSURE: 72 MMHG | OXYGEN SATURATION: 100 % | WEIGHT: 101.8 LBS | HEART RATE: 57 BPM | SYSTOLIC BLOOD PRESSURE: 160 MMHG

## 2023-12-06 DIAGNOSIS — R42 VERTIGO: ICD-10-CM

## 2023-12-06 PROCEDURE — 1126F AMNT PAIN NOTED NONE PRSNT: CPT | Performed by: INTERNAL MEDICINE

## 2023-12-06 PROCEDURE — 4004F PT TOBACCO SCREEN RCVD TLK: CPT | Performed by: INTERNAL MEDICINE

## 2023-12-06 PROCEDURE — 1159F MED LIST DOCD IN RCRD: CPT | Performed by: INTERNAL MEDICINE

## 2023-12-06 PROCEDURE — 99214 OFFICE O/P EST MOD 30 MIN: CPT | Performed by: INTERNAL MEDICINE

## 2023-12-06 PROCEDURE — 3077F SYST BP >= 140 MM HG: CPT | Performed by: INTERNAL MEDICINE

## 2023-12-06 PROCEDURE — 1160F RVW MEDS BY RX/DR IN RCRD: CPT | Performed by: INTERNAL MEDICINE

## 2023-12-06 PROCEDURE — 3078F DIAST BP <80 MM HG: CPT | Performed by: INTERNAL MEDICINE

## 2023-12-06 ASSESSMENT — ENCOUNTER SYMPTOMS
PALPITATIONS: 0
COUGH: 0
APPETITE CHANGE: 0
SHORTNESS OF BREATH: 0
ACTIVITY CHANGE: 0

## 2023-12-06 ASSESSMENT — PATIENT HEALTH QUESTIONNAIRE - PHQ9
SUM OF ALL RESPONSES TO PHQ9 QUESTIONS 1 AND 2: 0
1. LITTLE INTEREST OR PLEASURE IN DOING THINGS: NOT AT ALL
2. FEELING DOWN, DEPRESSED OR HOPELESS: NOT AT ALL

## 2023-12-06 NOTE — PROGRESS NOTES
"Subjective   Patient ID: Hafsa Wilson is a 83 y.o. female who presents for 2 week follow up and Vertigo.    HPI   Patient presents today following up with vertigo. Patient was given medrol dose pack at last visit. Patient states she finished prescription and does not notice any improvement. Patient states she is still experiencing dizziness and believes it is worse with certain head positioning.   Finished steroids    Has gained 2 lbs    Review of Systems   Constitutional:  Negative for activity change and appetite change.   Respiratory:  Negative for cough and shortness of breath.    Cardiovascular:  Negative for chest pain, palpitations and leg swelling.       Objective   /72 (BP Location: Left arm, Patient Position: Sitting)   Pulse 57   Ht 1.676 m (5' 6\")   Wt 46.2 kg (101 lb 12.8 oz)   SpO2 100%   BMI 16.43 kg/m²     Physical Exam  Vitals and nursing note reviewed.   Cardiovascular:      Rate and Rhythm: Normal rate and regular rhythm.   Pulmonary:      Effort: Pulmonary effort is normal.      Breath sounds: Normal breath sounds.         Assessment/Plan   Problem List Items Addressed This Visit    None  Visit Diagnoses         Codes    Vertigo     R42    Relevant Orders    Referral to Physical Therapy          Weight loss- monitor;    Follow up with me as scheduled     "

## 2023-12-07 ENCOUNTER — OFFICE VISIT (OUTPATIENT)
Dept: BEHAVIORAL HEALTH | Facility: CLINIC | Age: 83
End: 2023-12-07
Payer: MEDICARE

## 2023-12-07 VITALS
WEIGHT: 100 LBS | SYSTOLIC BLOOD PRESSURE: 152 MMHG | BODY MASS INDEX: 16.07 KG/M2 | HEART RATE: 72 BPM | HEIGHT: 66 IN | DIASTOLIC BLOOD PRESSURE: 63 MMHG

## 2023-12-07 DIAGNOSIS — F41.8 DEPRESSION WITH ANXIETY: ICD-10-CM

## 2023-12-07 PROCEDURE — 1126F AMNT PAIN NOTED NONE PRSNT: CPT | Performed by: PSYCHIATRY & NEUROLOGY

## 2023-12-07 PROCEDURE — 3078F DIAST BP <80 MM HG: CPT | Performed by: PSYCHIATRY & NEUROLOGY

## 2023-12-07 PROCEDURE — 4004F PT TOBACCO SCREEN RCVD TLK: CPT | Performed by: PSYCHIATRY & NEUROLOGY

## 2023-12-07 PROCEDURE — 1159F MED LIST DOCD IN RCRD: CPT | Performed by: PSYCHIATRY & NEUROLOGY

## 2023-12-07 PROCEDURE — 99204 OFFICE O/P NEW MOD 45 MIN: CPT | Performed by: PSYCHIATRY & NEUROLOGY

## 2023-12-07 PROCEDURE — 3077F SYST BP >= 140 MM HG: CPT | Performed by: PSYCHIATRY & NEUROLOGY

## 2023-12-07 PROCEDURE — 1160F RVW MEDS BY RX/DR IN RCRD: CPT | Performed by: PSYCHIATRY & NEUROLOGY

## 2023-12-07 RX ORDER — SERTRALINE HYDROCHLORIDE 50 MG/1
50 TABLET, FILM COATED ORAL DAILY
Qty: 30 TABLET | Refills: 1 | Status: SHIPPED | OUTPATIENT
Start: 2023-12-07 | End: 2024-04-23 | Stop reason: ALTCHOICE

## 2023-12-07 ASSESSMENT — ENCOUNTER SYMPTOMS
DECREASED CONCENTRATION: 1
HYPERACTIVE: 0
FATIGUE: 1
NERVOUS/ANXIOUS: 0
CONFUSION: 0
APPETITE CHANGE: 0
AGITATION: 0
HALLUCINATIONS: 0
SLEEP DISTURBANCE: 1
DYSPHORIC MOOD: 0
LIGHT-HEADEDNESS: 1

## 2023-12-07 NOTE — PROGRESS NOTES
Subjective   Patient ID: Hafsa Wilson is a 83 y.o. female who presents for depression   84 y/o female referred by PCP for depression   On Mirtazapine 30 mg, by PCP   Moved to [facility] community a year ago  Feels depressed, hypersomnia, hopeless, depression has been getting worse.   Reported no change in her appetite although she is under weight, smokes 5 cigarettes a day   Was in ED on 11/16/23, does not remember that she was   Head CT: Hypoattenuation of periventricular and subcortical white matter  suggestive of chronic small vessel ischemic disease. Mild diffuse  parenchymal volume loss is noted  No assistance needed in instrumental or basic activities of daily living   Records indicated she was on Bupropion for sometime. Weight has always been on the low side, for instance BMI was 16.93 gibran 2018         Past Psychiatric History:  No past inpatient admissions, no history of suicide or self harm   Substance Abuse History:  None  Family History:  Family History   Problem Relation Name Age of Onset    Hyperlipidemia Mother      Macular degeneration Mother      Other (Cardiac disorder) Father      Heart attack Father      Unaware of family history of mental illness   Social History:  Social History     Socioeconomic History    Marital status: Single     Spouse name: Not on file    Number of children: Not on file    Years of education: Not on file    Highest education level: Not on file   Occupational History    Not on file   Tobacco Use    Smoking status: Every Day     Packs/day: .25     Types: Cigarettes    Smokeless tobacco: Never   Vaping Use    Vaping Use: Never used   Substance and Sexual Activity    Alcohol use: Yes     Comment: rare    Drug use: Never    Sexual activity: Not on file   Other Topics Concern    Not on file   Social History Narrative    Not on file     Social Determinants of Health     Financial Resource Strain: Not on file   Food Insecurity: Not on file   Transportation Needs: Not on file    Physical Activity: Not on file   Stress: Not on file   Social Connections: Not on file   Intimate Partner Violence: Not on file   Housing Stability: Not on file        Born and raised in [place], one brother, raised by her parents,  for 15 years then , no history of trauma, has 3 children   Worked as a teacher        Review of Systems   Constitutional:  Positive for fatigue. Negative for appetite change.   Musculoskeletal:  Negative for gait problem.   Neurological:  Positive for light-headedness.   Psychiatric/Behavioral:  Positive for decreased concentration and sleep disturbance. Negative for agitation, behavioral problems, confusion, dysphoric mood, hallucinations, self-injury and suicidal ideas. The patient is not nervous/anxious and is not hyperactive.        Objective   Vitals:    12/07/23 1321   BP: 152/63   Pulse: 72      Physical Exam  Psychiatric:         Attention and Perception: Attention and perception normal.         Mood and Affect: Mood is depressed.         Speech: Speech normal.         Behavior: Behavior normal. Behavior is cooperative.         Thought Content: Thought content normal.         Cognition and Memory: She exhibits impaired remote memory.         Lab Review:   Admission on 11/16/2023, Discharged on 11/16/2023   Component Date Value    WBC 11/16/2023 8.1     nRBC 11/16/2023 0.0     RBC 11/16/2023 4.41     Hemoglobin 11/16/2023 13.7     Hematocrit 11/16/2023 42.1     MCV 11/16/2023 96     MCH 11/16/2023 31.1     MCHC 11/16/2023 32.5     RDW 11/16/2023 13.0     Platelets 11/16/2023 281     Neutrophils % 11/16/2023 61.2     Immature Granulocytes %,* 11/16/2023 0.4     Lymphocytes % 11/16/2023 27.1     Monocytes % 11/16/2023 8.1     Eosinophils % 11/16/2023 2.5     Basophils % 11/16/2023 0.7     Neutrophils Absolute 11/16/2023 4.98     Immature Granulocytes Ab* 11/16/2023 0.03     Lymphocytes Absolute 11/16/2023 2.21     Monocytes Absolute 11/16/2023 0.66      Eosinophils Absolute 11/16/2023 0.20     Basophils Absolute 11/16/2023 0.06     Glucose 11/16/2023 90     Sodium 11/16/2023 137     Potassium 11/16/2023 3.8     Chloride 11/16/2023 103     Bicarbonate 11/16/2023 28     Anion Gap 11/16/2023 10     Urea Nitrogen 11/16/2023 10     Creatinine 11/16/2023 0.91     eGFR 11/16/2023 63     Calcium 11/16/2023 9.4     Albumin 11/16/2023 4.0     Alkaline Phosphatase 11/16/2023 81     Total Protein 11/16/2023 7.1     AST 11/16/2023 18     Bilirubin, Total 11/16/2023 0.3     ALT 11/16/2023 7     Troponin I, High Sensiti* 11/16/2023 6     Troponin I, High Sensiti* 11/16/2023 7     Coronavirus 2019, PCR 11/16/2023 Not Detected     Flu A Result 11/16/2023 Not Detected     Flu B Result 11/16/2023 Not Detected     Thyroid Stimulating Horm* 11/16/2023 1.74     Color, Urine 11/16/2023 Yellow     Appearance, Urine 11/16/2023 Clear     Specific Gravity, Urine 11/16/2023 1.011     pH, Urine 11/16/2023 5.0     Protein, Urine 11/16/2023 NEGATIVE     Glucose, Urine 11/16/2023 NEGATIVE     Blood, Urine 11/16/2023 NEGATIVE     Ketones, Urine 11/16/2023 NEGATIVE     Bilirubin, Urine 11/16/2023 NEGATIVE     Urobilinogen, Urine 11/16/2023 <2.0     Nitrite, Urine 11/16/2023 NEGATIVE     Leukocyte Esterase, Urine 11/16/2023 TRACE (A)     Extra Tube 11/16/2023 Hold for add-ons.     WBC, Urine 11/16/2023 1-5     RBC, Urine 11/16/2023 NONE     Bacteria, Urine 11/16/2023 1+ (A)     Mucus, Urine 11/16/2023 1+     Urine Culture 11/16/2023 No significant growth      Lab Results   Component Value Date     11/16/2023     02/22/2023     04/18/2022     02/13/2021    K 3.8 11/16/2023    K 4.6 02/22/2023    K 4.0 04/18/2022    K 4.2 02/13/2021    CO2 28 11/16/2023    CO2 32 02/22/2023    CO2 33 (H) 04/18/2022    CO2 31 02/13/2021    BUN 10 11/16/2023    BUN 17 02/22/2023    BUN 16 04/18/2022    BUN 21 02/13/2021    CREATININE 0.91 11/16/2023    CREATININE 0.98 02/22/2023    CREATININE  1.16 (H) 04/18/2022    CREATININE 1.15 (H) 02/13/2021    GLUCOSE 90 11/16/2023    GLUCOSE 82 02/22/2023    GLUCOSE 86 04/18/2022    GLUCOSE 75 02/13/2021    CALCIUM 9.4 11/16/2023    CALCIUM 10.4 (H) 02/22/2023    CALCIUM 10.1 04/18/2022    CALCIUM 10.0 02/13/2021     Lab Results   Component Value Date    WBC 8.1 11/16/2023    HGB 13.7 11/16/2023    HCT 42.1 11/16/2023    MCV 96 11/16/2023     11/16/2023     Lab Results   Component Value Date    CHOL 163 02/22/2023    TRIG 84 02/22/2023    HDL 66.7 02/22/2023       Assessment/Plan   Problem List Items Addressed This Visit       Depression with anxiety     Continue Mirtazapine 30 mg po daily, a trial Sertraline, 50 mg po daily with a plan to follow up with PCP for her high blood pressure, we may consider a trial of SNRI later   Follow up with me in 1 month or sooner if needed    Zunilda Daniels MD

## 2024-01-04 ENCOUNTER — CLINICAL SUPPORT (OUTPATIENT)
Dept: PHYSICAL THERAPY | Facility: CLINIC | Age: 84
End: 2024-01-04
Payer: MEDICARE

## 2024-01-04 DIAGNOSIS — G89.29 NECK PAIN, CHRONIC: ICD-10-CM

## 2024-01-04 DIAGNOSIS — R42 VERTIGO: Primary | ICD-10-CM

## 2024-01-04 DIAGNOSIS — M54.2 NECK PAIN, CHRONIC: ICD-10-CM

## 2024-01-04 DIAGNOSIS — R29.898 DECREASED ROM OF NECK: ICD-10-CM

## 2024-01-04 PROCEDURE — 97112 NEUROMUSCULAR REEDUCATION: CPT | Mod: GP

## 2024-01-04 PROCEDURE — 97162 PT EVAL MOD COMPLEX 30 MIN: CPT | Mod: GP

## 2024-01-04 ASSESSMENT — ENCOUNTER SYMPTOMS
LOSS OF SENSATION IN FEET: 0
DEPRESSION: 0
OCCASIONAL FEELINGS OF UNSTEADINESS: 1

## 2024-01-04 NOTE — PROGRESS NOTES
Physical Therapy Initial Evaluation:  Vestibular and Balance    ** NOTE:  Patient was over 15 minutes late to scheduled appointment.    Patient Name:  Hafsa Wilson   MRN:  [id number]   Date of Evaluation:  01/04/24   Time Calculation  Start Time: 1101  Stop Time: 1155  Time Calculation (min): 54 min  Visit Number:  1  Insurance Information:  2023 4% COINS, 150 DED, 1500 OOP MAX, VS MED NEC, 1 EVAL  DAYS, NO AUTH     1. Vertigo  Referral to Physical Therapy    Follow Up In Physical Therapy      2. Neck pain, chronic        3. Decreased ROM of neck            Assessment: Hafsa Wilson is a 83 year old female referred to outpatient PT for dizziness and imbalance.  Patient's subjective limited as she had difficulty describing symptoms and presentation.  Patient shows signs of imbalance with position changes, especially with rising (whether that be from supine or from sitting).  Patient's neck ROM is significantly impaired to which it limits potentially the effectiveness and accuracy of positional testing and treatment.  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  However, the effectiveness is yet to be known.  Patient was educated upon this and demonstrated understanding.  Based on patient's examination, concurrent co-morbidities, and presentation, patient's level of complexity is Moderate.  As a result, recommending continued PT services to facilitate improvement in CLOF.    Problems include:  Balance, Decreased functional level, Decreased knowledge of HEP, Dizziness / vertigo, Fall risk, Gait / locomotion, and ROM    Goals:  By Discharge patient will demo:  [place] in HEP  No falls during POC  DHI improvement by 18% to match MDC  Negative positional testing    Potential to achieve rehab goals is fair.    Plan:  1 times every 2-3 weeks for a total of 6 PRN visits.  Re-assessment after that time.    Activities that may be performed include but are not limited to:   "balance, gait, transfers, modalities (as appropriate), e-stim (as appropriate), canalith repositioning maneuvers, therapeutic exercise, therapeutic activities.    Hafsa Wilson in agreement with and understanding of all discussed this date.    ----------------------------------  ----------------------------------    Subjective:    Onset Date:  End of November    HPI:  Hafsa Wilson is a 83 year old female referred to outpatient PT for dizziness.  Feels like is dizzy all the time.  It is the worst when gets up in the morning.  Goes slowly now.  It's a vague feeling.  Unsure exactly how to describe it.  Doesn't feel like the room is spinning.  Feels somewhat unsteady.  Rolling over sometimes wakes her up.  \"These aren't huge, painful problems.\"  Unable to tie anything to it.  \"It comes whenever it wants and then it leaves.\"  Son-in-law looked up exercises / maneuvers for her to do at home, and those didn't seem to help.  No falls in the past 6 months.    Patient Goal:  \"see if you can do anything for me\"     (note:  \"y\" = yes; \"n\" = no)    Hx of:  - Anxiety suggested that is \"uptight\"  - Headaches / migraines (photo/phonophobia) very seldom  - Concussion n  - CVA n  - Neck pain \"terrible\" -- demoing profound loss of ROM in all direction except for flexion  - TMD n  - Motion sensitivity (car, boat) n  - Sinus infections n  - Ear infections n  - Heart conditions (afib, HTN, OH) n  - DM n  - Autoimmune disorders n  - Thyroid disorders y  - Alcohol use n  - Caffeine use 1-3 cups per day    Hearing:  - Loss (sudden or gradual) gradual  - Tinnitus n  - Audiogram n  - Autophony n    Eye Conditions: n    Vestibular Tests:   - VNG n  - VEMP n    Imaging:  - CT n  - MRI n    Precautions: increased risk of falls    [place] Fall Risk  One or more falls in the last year? No  How many Times?    Was the patient injured in the fall?    Has trouble stepping onto curb? Yes  Advised to use a cane or walker to get around safely? " "No  Often has to rush to toilet? No  Feels unsteady when walking? Yes  Has lost some feeling in feet? No  Often feels sad or depressed? No  Steadies self on furniture while walking at home? Yes  Takes medicine that makes them feel lightheaded or more tired than usual? No  Worried about Falling? Yes  Takes medicine to sleep or improve mood? Yes  Needs to push with hands when rising from a chair? Yes      Pain:  complaints of chronic neck discomfort    ----------------------------------  ----------------------------------    OBJECTIVE    Observation:  occasional episodes of multidirectional LOB upon rising to feet    ROM: significant (>50%) loss of ROM in all directions except for flexion    Strength: no gross abnormalities noted with functional mobility    Coordination: no gross abnormalities noted with functional mobility    Vestibular: (“g” = goggles, \"p\" = positive, \"n\" = negative, \"nt\" = not tested)  Occulomotor -  - ROM: n  - Smooth Pursuit:  saccadic  - Horizontal Saccades:  n  - Vertical Saccades:  n  - Eye Alignment:  n  - Static Visual Acuity: glasses  - Cover:  n  - Uncover:  n  - Cross Cover:  n  - Skew Deviation:  n  - Convergence:  n    Vestibular-Specific:  - Spontaneous Nystagmus: n  - Gaze Evoked Nystagmus:  n  - Horizontal VOR: n  - Vertical VOR:  n  - R Head Thrust:  n (limited d/t neck ROM restrictions)  - L Head Thrust:  n (limited d/t neck ROM restrictions)  - DVA:  nt  - Head Shake Nystagmus (Horizontal): nt  - Head Shake Nystagmus (Vertical):  nt    Positional Testing (note:  patient's ROM does not allow for most necessary positioning for these tests.  Modified in such a way to allow for testing as tolerable / possible.):  - R DHP: Positive upbeating with R torsion, < 30 seconds.  Patient does not report any dizziness in this position, however.  - R HRT:  n  - R Sidelying Test:  nt  - L DHP: n  - L HRT:  n  - L Sidelying Test: nt      Functional Mobility Assessment:  - Gait: some path " deviations noted with head rotation  - Transfers:  some sway noted upon   - Bed Mobility:  dizziness noted upon rising    Outcomes:  DHI:  30%      TREATMENT:  Neuromuscular Re-education (64425): 10 minutes  CRM performed to affected side x 1.  Educated extensively on BPPV and vestibular system.  Given handouts for testing / treating BPPV at home and information about the condition.  Told not to treat at home unless someone else is there.  Educated on precautions to consider (laying on affected side, laying flat, positional changes, quick movements).  Educated on how the patient can know if condition is resolved.

## 2024-01-10 ENCOUNTER — APPOINTMENT (OUTPATIENT)
Dept: PRIMARY CARE | Facility: CLINIC | Age: 84
End: 2024-01-10
Payer: MEDICARE

## 2024-01-18 ENCOUNTER — TREATMENT (OUTPATIENT)
Dept: PHYSICAL THERAPY | Facility: CLINIC | Age: 84
End: 2024-01-18
Payer: MEDICARE

## 2024-01-18 DIAGNOSIS — G89.29 NECK PAIN, CHRONIC: ICD-10-CM

## 2024-01-18 DIAGNOSIS — R29.898 DECREASED ROM OF NECK: ICD-10-CM

## 2024-01-18 DIAGNOSIS — R42 VERTIGO: Primary | ICD-10-CM

## 2024-01-18 DIAGNOSIS — M54.2 NECK PAIN, CHRONIC: ICD-10-CM

## 2024-01-18 PROCEDURE — 97530 THERAPEUTIC ACTIVITIES: CPT | Mod: GP

## 2024-01-18 NOTE — PROGRESS NOTES
"Physical Therapy Treatment      Patient Name: Hafsa Wilson  MRN: [id number]  Today's Date: 1/18/2024  Visit #: 2  Insurance: 2023 4% COINS, 150 DED, 1500 OOP MAX, VS MED NEC, 1 EVAL  DAYS, NO AUTH    Time Calculation  Start Time: 1047  Stop Time: 1126  Time Calculation (min): 39 min    1. Vertigo  Follow Up In Physical Therapy      2. Neck pain, chronic        3. Decreased ROM of neck            ASSESSMENT:  Patient demoing a resolution of symptoms and nystagmus.  Educated extensively on BPPV and proper procedures for Epley.  Encouraged to contact this therapist PRN.    GOALS:  By Discharge patient will demo:  [place] in HEP  No falls during POC  DHI improvement by 18% to match MDC  Negative positional testing  ^^ met all the above, although not given DHI this date.    PLAN:  Check in with patient on 1/30, PRN.  Give DHI if returns.    SUBJECTIVE:  Reports feeling much better since last saw this therapist.  At this point isnt having this feeling other than \"every once in a while.\"  Mind seems to be clearer.  Still notices it a little bit in the morning and afternoon when get up.  Sometimes notices it if moves too quickly.  Unsure if has been doing exercises well.    OBJECTIVE:    TREATMENT:    Therapeutic Activity (18949): 39 minutes    R DHP:  n  L DHP:  n    Orthostatic Hypotension Screen:  Position Supine Standing   Method Auto Aut   Placement L arm  L arm   BP Measure (mmHg) 196/89 198/83   HR   (Bpm) 67 65     Discussed BP with patient and potential to get further investigated, although this machine often reads elevated.    Discussed at length BPPV and procedure for R Epley.  Demonstrates understanding.  "

## 2024-01-23 ENCOUNTER — APPOINTMENT (OUTPATIENT)
Dept: PHYSICAL THERAPY | Facility: CLINIC | Age: 84
End: 2024-01-23
Payer: MEDICARE

## 2024-01-26 PROBLEM — Z86.39 HISTORY OF HYPOTHYROIDISM: Status: ACTIVE | Noted: 2024-01-26

## 2024-01-26 PROBLEM — N63.0 MASS OF BREAST: Status: ACTIVE | Noted: 2024-01-26

## 2024-01-26 PROBLEM — F41.9 ANXIETY: Status: ACTIVE | Noted: 2023-02-26

## 2024-01-26 PROBLEM — F09 MILD COGNITIVE DISORDER: Status: ACTIVE | Noted: 2024-01-26

## 2024-01-26 PROBLEM — R00.2 PALPITATIONS: Status: ACTIVE | Noted: 2024-01-26

## 2024-01-26 PROBLEM — Z86.79 HISTORY OF HYPERTENSION: Status: ACTIVE | Noted: 2024-01-26

## 2024-01-26 PROBLEM — I47.10 SUPRAVENTRICULAR TACHYCARDIA (CMS-HCC): Status: ACTIVE | Noted: 2024-01-26

## 2024-01-26 PROBLEM — R42 DIZZINESS AND GIDDINESS: Status: ACTIVE | Noted: 2024-01-26

## 2024-01-26 PROBLEM — H52.10 MYOPIA: Status: ACTIVE | Noted: 2023-02-26

## 2024-01-26 PROBLEM — F17.200 SMOKES TOBACCO DAILY: Status: ACTIVE | Noted: 2021-08-04

## 2024-01-26 PROBLEM — M70.22 OLECRANON BURSITIS OF LEFT ELBOW: Status: ACTIVE | Noted: 2024-01-26

## 2024-01-26 PROBLEM — B35.1 ONYCHOMYCOSIS: Status: ACTIVE | Noted: 2024-01-26

## 2024-01-26 PROBLEM — H61.20 IMPACTED CERUMEN: Status: ACTIVE | Noted: 2024-01-26

## 2024-01-29 ENCOUNTER — HOSPITAL ENCOUNTER (OUTPATIENT)
Dept: RADIOLOGY | Facility: CLINIC | Age: 84
Discharge: HOME | End: 2024-01-29
Payer: MEDICARE

## 2024-01-29 ENCOUNTER — OFFICE VISIT (OUTPATIENT)
Dept: PRIMARY CARE | Facility: CLINIC | Age: 84
End: 2024-01-29
Payer: MEDICARE

## 2024-01-29 VITALS
HEART RATE: 71 BPM | BODY MASS INDEX: 16.07 KG/M2 | OXYGEN SATURATION: 100 % | SYSTOLIC BLOOD PRESSURE: 147 MMHG | WEIGHT: 100 LBS | HEIGHT: 66 IN | DIASTOLIC BLOOD PRESSURE: 81 MMHG | TEMPERATURE: 96.9 F

## 2024-01-29 DIAGNOSIS — T14.90XA TRAUMA: ICD-10-CM

## 2024-01-29 DIAGNOSIS — N18.31 ANEMIA IN STAGE 3A CHRONIC KIDNEY DISEASE (MULTI): ICD-10-CM

## 2024-01-29 DIAGNOSIS — F41.8 DEPRESSION WITH ANXIETY: ICD-10-CM

## 2024-01-29 DIAGNOSIS — Z00.00 ROUTINE GENERAL MEDICAL EXAMINATION AT HEALTH CARE FACILITY: Primary | ICD-10-CM

## 2024-01-29 DIAGNOSIS — D63.1 ANEMIA IN STAGE 3A CHRONIC KIDNEY DISEASE (MULTI): ICD-10-CM

## 2024-01-29 DIAGNOSIS — Z12.31 ENCOUNTER FOR SCREENING MAMMOGRAM FOR BREAST CANCER: ICD-10-CM

## 2024-01-29 PROCEDURE — 1123F ACP DISCUSS/DSCN MKR DOCD: CPT | Performed by: INTERNAL MEDICINE

## 2024-01-29 PROCEDURE — 1158F ADVNC CARE PLAN TLK DOCD: CPT | Performed by: INTERNAL MEDICINE

## 2024-01-29 PROCEDURE — 72125 CT NECK SPINE W/O DYE: CPT

## 2024-01-29 PROCEDURE — G0439 PPPS, SUBSEQ VISIT: HCPCS | Performed by: INTERNAL MEDICINE

## 2024-01-29 PROCEDURE — 1126F AMNT PAIN NOTED NONE PRSNT: CPT | Performed by: INTERNAL MEDICINE

## 2024-01-29 PROCEDURE — 3079F DIAST BP 80-89 MM HG: CPT | Performed by: INTERNAL MEDICINE

## 2024-01-29 PROCEDURE — 70450 CT HEAD/BRAIN W/O DYE: CPT

## 2024-01-29 PROCEDURE — 1170F FXNL STATUS ASSESSED: CPT | Performed by: INTERNAL MEDICINE

## 2024-01-29 PROCEDURE — 1157F ADVNC CARE PLAN IN RCRD: CPT | Performed by: INTERNAL MEDICINE

## 2024-01-29 PROCEDURE — 3077F SYST BP >= 140 MM HG: CPT | Performed by: INTERNAL MEDICINE

## 2024-01-29 ASSESSMENT — ENCOUNTER SYMPTOMS
BLOOD IN STOOL: 0
PALPITATIONS: 0
DIFFICULTY URINATING: 0
NAUSEA: 0
TREMORS: 0
TROUBLE SWALLOWING: 0
UNEXPECTED WEIGHT CHANGE: 0
LIGHT-HEADEDNESS: 0
ACTIVITY CHANGE: 0
HEADACHES: 0
CHEST TIGHTNESS: 0
DIARRHEA: 0
COUGH: 0
FATIGUE: 1
WHEEZING: 0
DIZZINESS: 0
APPETITE CHANGE: 0
VOICE CHANGE: 0
ARTHRALGIAS: 0
CONSTIPATION: 0
HEMATURIA: 0
VOMITING: 0

## 2024-01-29 ASSESSMENT — ACTIVITIES OF DAILY LIVING (ADL)
MANAGING_FINANCES: INDEPENDENT
BATHING: INDEPENDENT
DRESSING: INDEPENDENT
GROCERY_SHOPPING: INDEPENDENT
TAKING_MEDICATION: INDEPENDENT
DOING_HOUSEWORK: INDEPENDENT

## 2024-01-29 NOTE — PROGRESS NOTES
Subjective   Patient ID: Hafsa Wilson is a 83 y.o. female who presents for Medicare Annual Wellness Visit Subsequent.    HPI   Patient is here for Medicare Annual visit, patient was fall on the back of the head last week , she wants to discuss that.    Review of Systems    Objective   There were no vitals taken for this visit.    Physical Exam    Assessment/Plan

## 2024-01-29 NOTE — PROGRESS NOTES
"Subjective   Reason for Visit: Hafsa Wilson is an 83 y.o. female here for a Medicare Wellness visit.               84 yo here for AMWV  1 week ago she fell in her garage - the automatic light turned off while she was in there - hit the back of her neck  Was able to get up on her own once she was able to find something to grab on to  Some bruising on her arms and legs  No LOC    Has continued neck pain    Saw psychiatry 12/7 - started on Sertraline - did not \"feel well on it\" Stopped the sertraline and did not go nto 1/11/24 appointment  Depression continues to worsen  Continues the Mirtazapine since she feels well on it              Patient Care Team:  Alie Weiss MD as PCP - General  Alie Weiss MD as PCP - [employer] PCP     Review of Systems   Constitutional:  Positive for fatigue. Negative for activity change, appetite change and unexpected weight change.   HENT:  Negative for ear pain, hearing loss, tinnitus, trouble swallowing and voice change.    Eyes:  Negative for visual disturbance.   Respiratory:  Negative for cough, chest tightness and wheezing.    Cardiovascular:  Negative for chest pain, palpitations and leg swelling.   Gastrointestinal:  Negative for blood in stool, constipation, diarrhea, nausea and vomiting.   Genitourinary:  Negative for difficulty urinating, hematuria and vaginal bleeding.   Musculoskeletal:  Negative for arthralgias.   Skin:  Negative for rash.   Neurological:  Negative for dizziness, tremors, syncope, light-headedness and headaches.       Objective   Vitals:  /81   Pulse 71   Temp 36.1 °C (96.9 °F)   Ht 1.676 m (5' 6\")   Wt 45.4 kg (100 lb)   SpO2 100%   BMI 16.14 kg/m²       Physical Exam  Constitutional:       General: She is not in acute distress.     Appearance: She is well-developed. She is not diaphoretic.   HENT:      Head: Normocephalic.      Right Ear: Tympanic membrane normal. There is no impacted cerumen.      Left Ear: " Tympanic membrane normal. There is no impacted cerumen.      Nose: Nose normal.      Mouth/Throat:      Mouth: Mucous membranes are moist.      Pharynx: Oropharynx is clear. No oropharyngeal exudate or posterior oropharyngeal erythema.   Eyes:      General: No scleral icterus.     Extraocular Movements: Extraocular movements intact.      Conjunctiva/sclera: Conjunctivae normal.      Pupils: Pupils are equal, round, and reactive to light.   Neck:      Thyroid: No thyromegaly.      Vascular: No JVD.   Cardiovascular:      Rate and Rhythm: Normal rate and regular rhythm.      Pulses: Normal pulses.      Heart sounds: Normal heart sounds. No murmur heard.     No friction rub. No gallop.   Pulmonary:      Effort: Pulmonary effort is normal. No respiratory distress.      Breath sounds: Normal breath sounds. No wheezing or rales.   Chest:      Chest wall: No tenderness.   Abdominal:      General: Bowel sounds are normal. There is no distension.      Palpations: Abdomen is soft. There is no mass.      Tenderness: There is no abdominal tenderness. There is no rebound.   Musculoskeletal:         General: Normal range of motion.      Cervical back: Normal range of motion and neck supple.   Lymphadenopathy:      Cervical: No cervical adenopathy.   Skin:     General: Skin is warm and dry.   Neurological:      General: No focal deficit present.      Mental Status: She is alert and oriented to person, place, and time.      Deep Tendon Reflexes: Reflexes normal.   Psychiatric:         Mood and Affect: Mood normal.         Thought Content: Thought content normal.       Neck - 3cm ecchymosis on cervical spine - tender to palp  Assessment/Plan   Problem List Items Addressed This Visit       Anemia in stage 3a chronic kidney disease (CMS/HCC)    Current Assessment & Plan     Stable         Depression with anxiety    Current Assessment & Plan     Encouraged to reschedule with psych         Relevant Orders    3 Month Follow Up In  [facility] - PCP     Other Visit Diagnoses       Routine general medical examination at health care facility    -  Primary    Relevant Orders    1 Year Follow Up In [facility] - PCP - Wellness Exam    Trauma        Recommend CT  If negative will have her use heat and tylenol    Relevant Orders    CT head wo IV contrast (Completed)    CT cervical spine wo IV contrast (Completed)    Encounter for screening mammogram for breast cancer        Relevant Orders    BI mammo bilateral screening tomosynthesis        Follow up with me in 3-4 months    Add - CT negative - rec pt use heat and tylenol and call back in 1 week if no better

## 2024-01-30 ENCOUNTER — TREATMENT (OUTPATIENT)
Dept: PHYSICAL THERAPY | Facility: CLINIC | Age: 84
End: 2024-01-30
Payer: MEDICARE

## 2024-01-30 DIAGNOSIS — G89.29 NECK PAIN, CHRONIC: ICD-10-CM

## 2024-01-30 DIAGNOSIS — R29.898 DECREASED ROM OF NECK: ICD-10-CM

## 2024-01-30 DIAGNOSIS — M54.2 NECK PAIN, CHRONIC: ICD-10-CM

## 2024-01-30 DIAGNOSIS — R42 VERTIGO: Primary | ICD-10-CM

## 2024-01-30 PROCEDURE — 97530 THERAPEUTIC ACTIVITIES: CPT | Mod: GP

## 2024-01-30 NOTE — PROGRESS NOTES
"Physical Therapy Treatment      Patient Name: Hafsa Wilson  MRN: [id number]  Today's Date: 1/30/2024  Visit #: 3  Insurance: 2023 4% COINS, 150 DED, 1500 OOP MAX, VS MED NEC, 1 EVAL  DAYS, NO AUTH    Time Calculation  Start Time: 1218  Stop Time: 1246  Time Calculation (min): 28 min    1. Vertigo  Follow Up In Physical Therapy      2. Neck pain, chronic        3. Decreased ROM of neck              ASSESSMENT:  Patient given Romberg EC for home.  Encouraged to reach out to this therapist as needed.  No further needs otherwise.    GOALS:  By Discharge patient will demo:  [place] in HEP  No falls during POC  DHI improvement by 18% to match MDC  Negative positional testing  ^^ met all the above previously, although fell because of moving in the dark and tripped / fell because couldn't see anything.    PLAN:  DC at this time.    SUBJECTIVE:  Been doing well over the last few weeks.  Only had one episode.  A little bit of a \"fuzzy\" feeling every so often.  Had a \"nasty\" fall this past Wednesday when was in the garage, the light went out and couldn't see anything to find out where she was.  Because of this she lost balance and fell and hit the back of head on concrete.  Got a head and neck CT which came back negative.    OBJECTIVE:    TREATMENT:    Therapeutic Activity (28317): 28 minutes  Discussed CLOF and reliance on vision.  Discussed performing Romberg EC in Corner with Chair in front 3 sets x 60 seconds daily.  Discussed need to perform Epley only when has episodes of vertigo.  Discussed benefits of heat vs. Ice and making rice heat pack.  "

## 2024-01-31 PROBLEM — N63.0 MASS OF BREAST: Status: RESOLVED | Noted: 2024-01-26 | Resolved: 2024-01-31

## 2024-02-02 ENCOUNTER — HOSPITAL ENCOUNTER (OUTPATIENT)
Dept: RADIOLOGY | Facility: CLINIC | Age: 84
Discharge: HOME | End: 2024-02-02
Payer: MEDICARE

## 2024-02-02 VITALS — HEIGHT: 66 IN | WEIGHT: 100.09 LBS | BODY MASS INDEX: 16.09 KG/M2

## 2024-02-02 DIAGNOSIS — Z12.31 ENCOUNTER FOR SCREENING MAMMOGRAM FOR BREAST CANCER: ICD-10-CM

## 2024-02-02 PROCEDURE — 77067 SCR MAMMO BI INCL CAD: CPT | Performed by: STUDENT IN AN ORGANIZED HEALTH CARE EDUCATION/TRAINING PROGRAM

## 2024-02-02 PROCEDURE — 77063 BREAST TOMOSYNTHESIS BI: CPT | Performed by: STUDENT IN AN ORGANIZED HEALTH CARE EDUCATION/TRAINING PROGRAM

## 2024-02-02 PROCEDURE — 77067 SCR MAMMO BI INCL CAD: CPT

## 2024-02-20 ENCOUNTER — OFFICE VISIT (OUTPATIENT)
Dept: BEHAVIORAL HEALTH | Facility: CLINIC | Age: 84
End: 2024-02-20
Payer: MEDICARE

## 2024-02-20 ENCOUNTER — LAB (OUTPATIENT)
Dept: LAB | Facility: LAB | Age: 84
End: 2024-02-20
Payer: MEDICARE

## 2024-02-20 VITALS
BODY MASS INDEX: 16.66 KG/M2 | HEIGHT: 65 IN | WEIGHT: 100 LBS | DIASTOLIC BLOOD PRESSURE: 105 MMHG | HEART RATE: 69 BPM | SYSTOLIC BLOOD PRESSURE: 174 MMHG

## 2024-02-20 DIAGNOSIS — F09 MILD COGNITIVE DISORDER: ICD-10-CM

## 2024-02-20 DIAGNOSIS — F41.8 DEPRESSION WITH ANXIETY: ICD-10-CM

## 2024-02-20 LAB — VIT B12 SERPL-MCNC: 225 PG/ML (ref 211–911)

## 2024-02-20 PROCEDURE — 3077F SYST BP >= 140 MM HG: CPT | Performed by: PSYCHIATRY & NEUROLOGY

## 2024-02-20 PROCEDURE — 86780 TREPONEMA PALLIDUM: CPT

## 2024-02-20 PROCEDURE — 1159F MED LIST DOCD IN RCRD: CPT | Performed by: PSYCHIATRY & NEUROLOGY

## 2024-02-20 PROCEDURE — 1160F RVW MEDS BY RX/DR IN RCRD: CPT | Performed by: PSYCHIATRY & NEUROLOGY

## 2024-02-20 PROCEDURE — 36415 COLL VENOUS BLD VENIPUNCTURE: CPT

## 2024-02-20 PROCEDURE — 1123F ACP DISCUSS/DSCN MKR DOCD: CPT | Performed by: PSYCHIATRY & NEUROLOGY

## 2024-02-20 PROCEDURE — 82607 VITAMIN B-12: CPT

## 2024-02-20 PROCEDURE — 1157F ADVNC CARE PLAN IN RCRD: CPT | Performed by: PSYCHIATRY & NEUROLOGY

## 2024-02-20 PROCEDURE — 1126F AMNT PAIN NOTED NONE PRSNT: CPT | Performed by: PSYCHIATRY & NEUROLOGY

## 2024-02-20 PROCEDURE — 99214 OFFICE O/P EST MOD 30 MIN: CPT | Performed by: PSYCHIATRY & NEUROLOGY

## 2024-02-20 PROCEDURE — 3080F DIAST BP >= 90 MM HG: CPT | Performed by: PSYCHIATRY & NEUROLOGY

## 2024-02-20 RX ORDER — MIRTAZAPINE 45 MG/1
45 TABLET, FILM COATED ORAL NIGHTLY
Qty: 30 TABLET | Refills: 2 | Status: SHIPPED | OUTPATIENT
Start: 2024-02-20 | End: 2024-04-23 | Stop reason: SDUPTHER

## 2024-02-20 ASSESSMENT — ENCOUNTER SYMPTOMS
CONFUSION: 0
AGITATION: 0
NERVOUS/ANXIOUS: 1
SEIZURES: 0
DECREASED CONCENTRATION: 1
HALLUCINATIONS: 0
SLEEP DISTURBANCE: 0
TREMORS: 1
HYPERACTIVE: 0
DYSPHORIC MOOD: 0

## 2024-02-20 NOTE — PROGRESS NOTES
Subjective   Patient ID: Hafsa Wilson is a 83 y.o. female who presents for follow up  We started Zoloft last giuliano, ad continued Remeron and recommended to follow up with PCP for elevated blood pressure.   Expressed her sadness about her friend`s current situation, she might be dying.   Could not tolerate Zoloft, took it for about a week. Felt her motivation is lower, however, she already feels low energy. Basically, no change from last giuliano, no thoughts of suicide.   Memory is declining, noticed if she gets anxious, she does not think clearly   MOCA:   Visuospatial: 3/5  Namin/3  Attention:   Language: 3/3  Abstraction:   Delayed recall:   Orientation:      No issues with daily activities of daily living         Review of Systems   Musculoskeletal:  Negative for gait problem.   Neurological:  Positive for tremors. Negative for seizures.   Psychiatric/Behavioral:  Positive for decreased concentration. Negative for agitation, behavioral problems, confusion, dysphoric mood, hallucinations, self-injury, sleep disturbance and suicidal ideas. The patient is nervous/anxious. The patient is not hyperactive.        Objective   Physical Exam  Psychiatric:         Attention and Perception: Attention and perception normal.         Mood and Affect: Mood is anxious and depressed.         Speech: Speech normal.         Behavior: Behavior normal. Behavior is cooperative.         Thought Content: Thought content normal.         Cognition and Memory: Cognition is impaired.         Judgment: Judgment normal.       Vitals:    24 1430   BP: (!) 174/105   Pulse: 69      Admission on 2023, Discharged on 2023   Component Date Value    WBC 2023 8.1     nRBC 2023 0.0     RBC 2023 4.41     Hemoglobin 2023 13.7     Hematocrit 2023 42.1     MCV 2023 96     MCH 2023 31.1     MCHC 2023 32.5     RDW 2023 13.0     Platelets 2023 281     Neutrophils  % 11/16/2023 61.2     Immature Granulocytes %,* 11/16/2023 0.4     Lymphocytes % 11/16/2023 27.1     Monocytes % 11/16/2023 8.1     Eosinophils % 11/16/2023 2.5     Basophils % 11/16/2023 0.7     Neutrophils Absolute 11/16/2023 4.98     Immature Granulocytes Ab* 11/16/2023 0.03     Lymphocytes Absolute 11/16/2023 2.21     Monocytes Absolute 11/16/2023 0.66     Eosinophils Absolute 11/16/2023 0.20     Basophils Absolute 11/16/2023 0.06     Glucose 11/16/2023 90     Sodium 11/16/2023 137     Potassium 11/16/2023 3.8     Chloride 11/16/2023 103     Bicarbonate 11/16/2023 28     Anion Gap 11/16/2023 10     Urea Nitrogen 11/16/2023 10     Creatinine 11/16/2023 0.91     eGFR 11/16/2023 63     Calcium 11/16/2023 9.4     Albumin 11/16/2023 4.0     Alkaline Phosphatase 11/16/2023 81     Total Protein 11/16/2023 7.1     AST 11/16/2023 18     Bilirubin, Total 11/16/2023 0.3     ALT 11/16/2023 7     Troponin I, High Sensiti* 11/16/2023 6     Troponin I, High Sensiti* 11/16/2023 7     Coronavirus 2019, PCR 11/16/2023 Not Detected     Flu A Result 11/16/2023 Not Detected     Flu B Result 11/16/2023 Not Detected     Thyroid Stimulating Horm* 11/16/2023 1.74     Color, Urine 11/16/2023 Yellow     Appearance, Urine 11/16/2023 Clear     Specific Gravity, Urine 11/16/2023 1.011     pH, Urine 11/16/2023 5.0     Protein, Urine 11/16/2023 NEGATIVE     Glucose, Urine 11/16/2023 NEGATIVE     Blood, Urine 11/16/2023 NEGATIVE     Ketones, Urine 11/16/2023 NEGATIVE     Bilirubin, Urine 11/16/2023 NEGATIVE     Urobilinogen, Urine 11/16/2023 <2.0     Nitrite, Urine 11/16/2023 NEGATIVE     Leukocyte Esterase, Urine 11/16/2023 TRACE (A)     Extra Tube 11/16/2023 Hold for add-ons.     WBC, Urine 11/16/2023 1-5     RBC, Urine 11/16/2023 NONE     Bacteria, Urine 11/16/2023 1+ (A)     Mucus, Urine 11/16/2023 1+     Urine Culture 11/16/2023 No significant growth      Lab Results   Component Value Date     11/16/2023     02/22/2023    NA  138 04/18/2022     02/13/2021    K 3.8 11/16/2023    K 4.6 02/22/2023    K 4.0 04/18/2022    K 4.2 02/13/2021    CO2 28 11/16/2023    CO2 32 02/22/2023    CO2 33 (H) 04/18/2022    CO2 31 02/13/2021    BUN 10 11/16/2023    BUN 17 02/22/2023    BUN 16 04/18/2022    BUN 21 02/13/2021    CREATININE 0.91 11/16/2023    CREATININE 0.98 02/22/2023    CREATININE 1.16 (H) 04/18/2022    CREATININE 1.15 (H) 02/13/2021    GLUCOSE 90 11/16/2023    GLUCOSE 82 02/22/2023    GLUCOSE 86 04/18/2022    GLUCOSE 75 02/13/2021    CALCIUM 9.4 11/16/2023    CALCIUM 10.4 (H) 02/22/2023    CALCIUM 10.1 04/18/2022    CALCIUM 10.0 02/13/2021     Lab Results   Component Value Date    WBC 8.1 11/16/2023    HGB 13.7 11/16/2023    HCT 42.1 11/16/2023    MCV 96 11/16/2023     11/16/2023     Lab Results   Component Value Date    CHOL 163 02/22/2023    TRIG 84 02/22/2023    HDL 66.7 02/22/2023     Assessment/Plan   Problem List Items Addressed This Visit             ICD-10-CM    Depression with anxiety F41.8    Mild cognitive disorder F09     Will complete cognitive d/o work up, order B12 and RPR  Increase Mirtazapine to 45 mg po at bedtime   Encouraged to follow up with PCP for elevated blood pressure   Follow up with me in 1-2 months or sooner if needed

## 2024-02-21 LAB — TREPONEMA PALLIDUM IGG+IGM AB [PRESENCE] IN SERUM OR PLASMA BY IMMUNOASSAY: NONREACTIVE

## 2024-03-11 DIAGNOSIS — E03.9 ACQUIRED HYPOTHYROIDISM: ICD-10-CM

## 2024-03-11 RX ORDER — LEVOTHYROXINE SODIUM 100 UG/1
100 TABLET ORAL DAILY
Qty: 90 TABLET | Refills: 1 | Status: SHIPPED | OUTPATIENT
Start: 2024-03-11

## 2024-04-23 ENCOUNTER — OFFICE VISIT (OUTPATIENT)
Dept: BEHAVIORAL HEALTH | Facility: CLINIC | Age: 84
End: 2024-04-23
Payer: MEDICARE

## 2024-04-23 VITALS
HEIGHT: 67 IN | BODY MASS INDEX: 15.22 KG/M2 | SYSTOLIC BLOOD PRESSURE: 149 MMHG | WEIGHT: 97 LBS | HEART RATE: 73 BPM | DIASTOLIC BLOOD PRESSURE: 115 MMHG

## 2024-04-23 DIAGNOSIS — F09 MILD COGNITIVE DISORDER: ICD-10-CM

## 2024-04-23 DIAGNOSIS — F41.8 DEPRESSION WITH ANXIETY: ICD-10-CM

## 2024-04-23 PROCEDURE — 3077F SYST BP >= 140 MM HG: CPT | Performed by: PSYCHIATRY & NEUROLOGY

## 2024-04-23 PROCEDURE — 1160F RVW MEDS BY RX/DR IN RCRD: CPT | Performed by: PSYCHIATRY & NEUROLOGY

## 2024-04-23 PROCEDURE — 99214 OFFICE O/P EST MOD 30 MIN: CPT | Performed by: PSYCHIATRY & NEUROLOGY

## 2024-04-23 PROCEDURE — 3080F DIAST BP >= 90 MM HG: CPT | Performed by: PSYCHIATRY & NEUROLOGY

## 2024-04-23 PROCEDURE — 1123F ACP DISCUSS/DSCN MKR DOCD: CPT | Performed by: PSYCHIATRY & NEUROLOGY

## 2024-04-23 PROCEDURE — 1157F ADVNC CARE PLAN IN RCRD: CPT | Performed by: PSYCHIATRY & NEUROLOGY

## 2024-04-23 PROCEDURE — 1159F MED LIST DOCD IN RCRD: CPT | Performed by: PSYCHIATRY & NEUROLOGY

## 2024-04-23 RX ORDER — MIRTAZAPINE 45 MG/1
45 TABLET, FILM COATED ORAL NIGHTLY
Qty: 30 TABLET | Refills: 3 | Status: SHIPPED | OUTPATIENT
Start: 2024-04-23 | End: 2024-08-21

## 2024-04-23 ASSESSMENT — ENCOUNTER SYMPTOMS
TREMORS: 1
DYSPHORIC MOOD: 0
NAUSEA: 0
HALLUCINATIONS: 0
SLEEP DISTURBANCE: 0
DIARRHEA: 0
AGITATION: 0
CONFUSION: 0
HYPERACTIVE: 0
DECREASED CONCENTRATION: 1
VOMITING: 0
SEIZURES: 0
NERVOUS/ANXIOUS: 1

## 2024-04-23 NOTE — PROGRESS NOTES
"Subjective   Patient ID: Hafsa Wilson is a 84 y.o. female who presents for follow up   Anxious about [place] trip  Sleep is ok,   Demential work up is normal, no abnormalities.   Being around people seems to help with her mood   Misplacing things, memory is \" slipping\". [facility] last giuliano 21/30   No change in activities of daily living   Zoloft, and Wellbutrin were not previously tolerated           Review of Systems   Gastrointestinal:  Negative for diarrhea, nausea and vomiting.   Musculoskeletal:  Negative for gait problem.   Neurological:  Positive for tremors. Negative for seizures.        Not resting, does not seem Parkinsonian. Not interested in neurology referral    Psychiatric/Behavioral:  Positive for decreased concentration. Negative for agitation, behavioral problems, confusion, dysphoric mood, hallucinations, self-injury, sleep disturbance and suicidal ideas. The patient is nervous/anxious. The patient is not hyperactive.        Objective   Physical Exam  Psychiatric:         Attention and Perception: Attention and perception normal.         Mood and Affect: Mood is anxious and depressed.         Speech: Speech normal.         Behavior: Behavior normal. Behavior is cooperative.         Thought Content: Thought content normal.         Cognition and Memory: Cognition is impaired.       Vitals:    04/23/24 1110   BP: (!) 149/115   Pulse: 73      Lab on 02/20/2024   Component Date Value    Vitamin B12 02/20/2024 225     Syphilis Total Ab 02/20/2024 Nonreactive    Admission on 11/16/2023, Discharged on 11/16/2023   Component Date Value    WBC 11/16/2023 8.1     nRBC 11/16/2023 0.0     RBC 11/16/2023 4.41     Hemoglobin 11/16/2023 13.7     Hematocrit 11/16/2023 42.1     MCV 11/16/2023 96     MCH 11/16/2023 31.1     MCHC 11/16/2023 32.5     RDW 11/16/2023 13.0     Platelets 11/16/2023 281     Neutrophils % 11/16/2023 61.2     Immature Granulocytes %,* 11/16/2023 0.4     Lymphocytes % 11/16/2023 27.1     Monocytes % " 11/16/2023 8.1     Eosinophils % 11/16/2023 2.5     Basophils % 11/16/2023 0.7     Neutrophils Absolute 11/16/2023 4.98     Immature Granulocytes Ab* 11/16/2023 0.03     Lymphocytes Absolute 11/16/2023 2.21     Monocytes Absolute 11/16/2023 0.66     Eosinophils Absolute 11/16/2023 0.20     Basophils Absolute 11/16/2023 0.06     Glucose 11/16/2023 90     Sodium 11/16/2023 137     Potassium 11/16/2023 3.8     Chloride 11/16/2023 103     Bicarbonate 11/16/2023 28     Anion Gap 11/16/2023 10     Urea Nitrogen 11/16/2023 10     Creatinine 11/16/2023 0.91     eGFR 11/16/2023 63     Calcium 11/16/2023 9.4     Albumin 11/16/2023 4.0     Alkaline Phosphatase 11/16/2023 81     Total Protein 11/16/2023 7.1     AST 11/16/2023 18     Bilirubin, Total 11/16/2023 0.3     ALT 11/16/2023 7     Troponin I, High Sensiti* 11/16/2023 6     Troponin I, High Sensiti* 11/16/2023 7     Coronavirus 2019, PCR 11/16/2023 Not Detected     Flu A Result 11/16/2023 Not Detected     Flu B Result 11/16/2023 Not Detected     Thyroid Stimulating Horm* 11/16/2023 1.74     Color, Urine 11/16/2023 Yellow     Appearance, Urine 11/16/2023 Clear     Specific Gravity, Urine 11/16/2023 1.011     pH, Urine 11/16/2023 5.0     Protein, Urine 11/16/2023 NEGATIVE     Glucose, Urine 11/16/2023 NEGATIVE     Blood, Urine 11/16/2023 NEGATIVE     Ketones, Urine 11/16/2023 NEGATIVE     Bilirubin, Urine 11/16/2023 NEGATIVE     Urobilinogen, Urine 11/16/2023 <2.0     Nitrite, Urine 11/16/2023 NEGATIVE     Leukocyte Esterase, Urine 11/16/2023 TRACE (A)     Extra Tube 11/16/2023 Hold for add-ons.     WBC, Urine 11/16/2023 1-5     RBC, Urine 11/16/2023 NONE     Bacteria, Urine 11/16/2023 1+ (A)     Mucus, Urine 11/16/2023 1+     Urine Culture 11/16/2023 No significant growth      Lab Results   Component Value Date     11/16/2023     02/22/2023     04/18/2022     02/13/2021    K 3.8 11/16/2023    K 4.6 02/22/2023    K 4.0 04/18/2022    K 4.2 02/13/2021     CO2 28 11/16/2023    CO2 32 02/22/2023    CO2 33 (H) 04/18/2022    CO2 31 02/13/2021    BUN 10 11/16/2023    BUN 17 02/22/2023    BUN 16 04/18/2022    BUN 21 02/13/2021    CREATININE 0.91 11/16/2023    CREATININE 0.98 02/22/2023    CREATININE 1.16 (H) 04/18/2022    CREATININE 1.15 (H) 02/13/2021    GLUCOSE 90 11/16/2023    GLUCOSE 82 02/22/2023    GLUCOSE 86 04/18/2022    GLUCOSE 75 02/13/2021    CALCIUM 9.4 11/16/2023    CALCIUM 10.4 (H) 02/22/2023    CALCIUM 10.1 04/18/2022    CALCIUM 10.0 02/13/2021     Lab Results   Component Value Date    WBC 8.1 11/16/2023    HGB 13.7 11/16/2023    HCT 42.1 11/16/2023    MCV 96 11/16/2023     11/16/2023     Lab Results   Component Value Date    CHOL 163 02/22/2023    TRIG 84 02/22/2023    HDL 66.7 02/22/2023     Assessment/Plan   Problem List Items Addressed This Visit             ICD-10-CM    Depression with anxiety F41.8    Mild cognitive disorder F09   May benefit from SNRI for depression, due her elevated blood pressure, we want it to be fixed before further adjustment on her meds   Order neuropsychological testing    Follow up with PCP for elevated blood pressure   Follow up with me in 3-4 months or sooner if needed   Continue Mirtazapine 45 mg po at bedtime

## 2024-04-24 ENCOUNTER — TELEPHONE (OUTPATIENT)
Dept: PRIMARY CARE | Facility: CLINIC | Age: 84
End: 2024-04-24

## 2024-04-24 ENCOUNTER — OFFICE VISIT (OUTPATIENT)
Dept: PRIMARY CARE | Facility: CLINIC | Age: 84
End: 2024-04-24
Payer: MEDICARE

## 2024-04-24 VITALS
BODY MASS INDEX: 15.73 KG/M2 | HEIGHT: 67 IN | SYSTOLIC BLOOD PRESSURE: 168 MMHG | OXYGEN SATURATION: 96 % | TEMPERATURE: 96.8 F | WEIGHT: 100.2 LBS | HEART RATE: 68 BPM | DIASTOLIC BLOOD PRESSURE: 83 MMHG

## 2024-04-24 DIAGNOSIS — I10 PRIMARY HYPERTENSION: Primary | ICD-10-CM

## 2024-04-24 DIAGNOSIS — E03.9 ACQUIRED HYPOTHYROIDISM: ICD-10-CM

## 2024-04-24 DIAGNOSIS — F41.8 DEPRESSION WITH ANXIETY: ICD-10-CM

## 2024-04-24 PROBLEM — T14.90XA TRAUMATIC INJURY: Status: ACTIVE | Noted: 2024-01-29

## 2024-04-24 PROBLEM — Z86.39 HISTORY OF HYPOTHYROIDISM: Status: RESOLVED | Noted: 2024-01-26 | Resolved: 2024-04-24

## 2024-04-24 PROBLEM — Z86.79 HISTORY OF HYPERTENSION: Status: RESOLVED | Noted: 2024-01-26 | Resolved: 2024-04-24

## 2024-04-24 PROCEDURE — 99214 OFFICE O/P EST MOD 30 MIN: CPT | Performed by: INTERNAL MEDICINE

## 2024-04-24 PROCEDURE — 1123F ACP DISCUSS/DSCN MKR DOCD: CPT | Performed by: INTERNAL MEDICINE

## 2024-04-24 PROCEDURE — 1160F RVW MEDS BY RX/DR IN RCRD: CPT | Performed by: INTERNAL MEDICINE

## 2024-04-24 PROCEDURE — G2211 COMPLEX E/M VISIT ADD ON: HCPCS | Performed by: INTERNAL MEDICINE

## 2024-04-24 PROCEDURE — 3077F SYST BP >= 140 MM HG: CPT | Performed by: INTERNAL MEDICINE

## 2024-04-24 PROCEDURE — 3079F DIAST BP 80-89 MM HG: CPT | Performed by: INTERNAL MEDICINE

## 2024-04-24 PROCEDURE — 1157F ADVNC CARE PLAN IN RCRD: CPT | Performed by: INTERNAL MEDICINE

## 2024-04-24 PROCEDURE — 1159F MED LIST DOCD IN RCRD: CPT | Performed by: INTERNAL MEDICINE

## 2024-04-24 RX ORDER — LISINOPRIL 10 MG/1
10 TABLET ORAL DAILY
Qty: 100 TABLET | Refills: 3 | Status: SHIPPED | OUTPATIENT
Start: 2024-04-24 | End: 2025-05-29

## 2024-04-24 ASSESSMENT — PATIENT HEALTH QUESTIONNAIRE - PHQ9
2. FEELING DOWN, DEPRESSED OR HOPELESS: NOT AT ALL
SUM OF ALL RESPONSES TO PHQ9 QUESTIONS 1 AND 2: 0
1. LITTLE INTEREST OR PLEASURE IN DOING THINGS: NOT AT ALL

## 2024-04-24 ASSESSMENT — ENCOUNTER SYMPTOMS
APPETITE CHANGE: 0
ACTIVITY CHANGE: 0
SHORTNESS OF BREATH: 0
PALPITATIONS: 0
COUGH: 0

## 2024-04-24 NOTE — TELEPHONE ENCOUNTER
Pt saw her psychiatrist yesterday and was advised she has high bp. She was advised to talk to her PCP. Pt reluctantly scheduled an appt for tomorrow at 2:30. She preferred to talk to Dr. Weiss to get medication. Advised pt we would need to check her bp. Please call pt and advise if Dr. Weiss can just talk to patient or should she keep her appointment. Pt advised she is going out of town this weekend

## 2024-04-24 NOTE — PROGRESS NOTES
Subjective   Patient ID: Hafsa Wilson is a 84 y.o. female who presents for Hypertension.  Here for followup  Was in psychiatrists office yesterday and BP was elevated - rec she contact us  Her BP has been up the last fw visits and she has been resistant to starting medications      Review of Systems   Constitutional:  Negative for activity change and appetite change.   Respiratory:  Negative for cough and shortness of breath.    Cardiovascular:  Negative for chest pain, palpitations and leg swelling.       Objective   Vitals:    04/24/24 1448   BP: 168/83   Pulse: 68   Temp: 36 °C (96.8 °F)   SpO2: 96%     Physical Exam  Vitals and nursing note reviewed.   Cardiovascular:      Rate and Rhythm: Normal rate and regular rhythm.   Pulmonary:      Effort: Pulmonary effort is normal.      Breath sounds: Normal breath sounds.       Assessment/Plan   Problem List Items Addressed This Visit       Depression with anxiety    Current Assessment & Plan     Stable         Hypothyroidism    Primary hypertension - Primary    Current Assessment & Plan     Start Lisinopril 10mg daily  Side effects discussed         Relevant Medications    lisinopril 10 mg tablet    Follow up with me as scheduled

## 2024-04-25 ENCOUNTER — APPOINTMENT (OUTPATIENT)
Dept: PRIMARY CARE | Facility: CLINIC | Age: 84
End: 2024-04-25
Payer: MEDICARE

## 2024-04-29 ENCOUNTER — APPOINTMENT (OUTPATIENT)
Dept: OPHTHALMOLOGY | Facility: CLINIC | Age: 84
End: 2024-04-29
Payer: MEDICARE

## 2024-05-01 ENCOUNTER — APPOINTMENT (OUTPATIENT)
Dept: PRIMARY CARE | Facility: CLINIC | Age: 84
End: 2024-05-01
Payer: MEDICARE

## 2024-05-14 ENCOUNTER — OFFICE VISIT (OUTPATIENT)
Dept: PRIMARY CARE | Facility: CLINIC | Age: 84
End: 2024-05-14
Payer: MEDICARE

## 2024-05-14 VITALS
HEART RATE: 66 BPM | BODY MASS INDEX: 15.51 KG/M2 | RESPIRATION RATE: 16 BRPM | SYSTOLIC BLOOD PRESSURE: 138 MMHG | OXYGEN SATURATION: 99 % | TEMPERATURE: 97.1 F | WEIGHT: 99 LBS | DIASTOLIC BLOOD PRESSURE: 78 MMHG

## 2024-05-14 DIAGNOSIS — F41.8 DEPRESSION WITH ANXIETY: ICD-10-CM

## 2024-05-14 DIAGNOSIS — I10 PRIMARY HYPERTENSION: Primary | ICD-10-CM

## 2024-05-14 PROCEDURE — 99214 OFFICE O/P EST MOD 30 MIN: CPT | Performed by: INTERNAL MEDICINE

## 2024-05-14 PROCEDURE — 3078F DIAST BP <80 MM HG: CPT | Performed by: INTERNAL MEDICINE

## 2024-05-14 PROCEDURE — 1159F MED LIST DOCD IN RCRD: CPT | Performed by: INTERNAL MEDICINE

## 2024-05-14 PROCEDURE — 3075F SYST BP GE 130 - 139MM HG: CPT | Performed by: INTERNAL MEDICINE

## 2024-05-14 PROCEDURE — 1157F ADVNC CARE PLAN IN RCRD: CPT | Performed by: INTERNAL MEDICINE

## 2024-05-14 PROCEDURE — 1160F RVW MEDS BY RX/DR IN RCRD: CPT | Performed by: INTERNAL MEDICINE

## 2024-05-14 PROCEDURE — 1123F ACP DISCUSS/DSCN MKR DOCD: CPT | Performed by: INTERNAL MEDICINE

## 2024-05-14 ASSESSMENT — ENCOUNTER SYMPTOMS
SHORTNESS OF BREATH: 0
COUGH: 0
PALPITATIONS: 0
APPETITE CHANGE: 0
ACTIVITY CHANGE: 0

## 2024-05-14 NOTE — PROGRESS NOTES
Subjective   Patient ID: Hafsa Wilson is a 84 y.o. female who presents for Follow-up (Patient here for a 3 month follow up).  Here for followup  Feeling well  Went to [place] with her son to watch her grandson play baseball  When she was gone was feeling much better - not depressed; was tired when returned    Taking lisinopril without side effects      Review of Systems   Constitutional:  Negative for activity change and appetite change.   Respiratory:  Negative for cough and shortness of breath.    Cardiovascular:  Negative for chest pain, palpitations and leg swelling.       Objective   Vitals:    05/14/24 0859   BP: 138/78   Pulse:    Resp:    Temp:    SpO2:      Physical Exam  Vitals and nursing note reviewed.   Cardiovascular:      Rate and Rhythm: Normal rate and regular rhythm.   Pulmonary:      Effort: Pulmonary effort is normal.      Breath sounds: Normal breath sounds.       Assessment/Plan   Problem List Items Addressed This Visit       Depression with anxiety    Current Assessment & Plan     Discussed more socialization  Consider a volunteer position - maybe a [employer]           Primary hypertension - Primary    Current Assessment & Plan     Improved         Follow up with me in 3 months

## 2024-05-20 ENCOUNTER — APPOINTMENT (OUTPATIENT)
Dept: PRIMARY CARE | Facility: CLINIC | Age: 84
End: 2024-05-20
Payer: MEDICARE

## 2024-06-17 ENCOUNTER — APPOINTMENT (OUTPATIENT)
Dept: OPHTHALMOLOGY | Facility: CLINIC | Age: 84
End: 2024-06-17
Payer: MEDICARE

## 2024-06-17 DIAGNOSIS — T85.22XA DISLOCATED IOL (INTRAOCULAR LENS), POSTERIOR: ICD-10-CM

## 2024-06-17 DIAGNOSIS — H52.223 REGULAR ASTIGMATISM OF BOTH EYES: ICD-10-CM

## 2024-06-17 DIAGNOSIS — H31.103 MYOPIC RETINOPATHY OF BOTH EYES: ICD-10-CM

## 2024-06-17 DIAGNOSIS — H35.30 ARMD (AGE-RELATED MACULAR DEGENERATION), BILATERAL: Primary | ICD-10-CM

## 2024-06-17 DIAGNOSIS — H35.89 RPE MOTTLING OF MACULA: ICD-10-CM

## 2024-06-17 PROCEDURE — 92134 CPTRZ OPH DX IMG PST SGM RTA: CPT | Mod: BILATERAL PROCEDURE | Performed by: OPHTHALMOLOGY

## 2024-06-17 PROCEDURE — 99213 OFFICE O/P EST LOW 20 MIN: CPT | Performed by: OPHTHALMOLOGY

## 2024-06-17 ASSESSMENT — SLIT LAMP EXAM - LIDS
COMMENTS: GOOD POSITION
COMMENTS: GOOD POSITION

## 2024-06-17 ASSESSMENT — CUP TO DISC RATIO
OD_RATIO: 0.1
OS_RATIO: 0.1

## 2024-06-17 ASSESSMENT — EXTERNAL EXAM - LEFT EYE: OS_EXAM: NORMAL

## 2024-06-17 ASSESSMENT — VISUAL ACUITY
OD_CC: 20/30
CORRECTION_TYPE: GLASSES
OS_CC: 20/25-2
METHOD: SNELLEN - LINEAR

## 2024-06-17 ASSESSMENT — ENCOUNTER SYMPTOMS
CONSTITUTIONAL NEGATIVE: 0
HEMATOLOGIC/LYMPHATIC NEGATIVE: 0
ALLERGIC/IMMUNOLOGIC NEGATIVE: 0
EYES NEGATIVE: 1
MUSCULOSKELETAL NEGATIVE: 0
GASTROINTESTINAL NEGATIVE: 0
RESPIRATORY NEGATIVE: 0
NEUROLOGICAL NEGATIVE: 0
ENDOCRINE NEGATIVE: 0
PSYCHIATRIC NEGATIVE: 0
CARDIOVASCULAR NEGATIVE: 0

## 2024-06-17 ASSESSMENT — TONOMETRY
IOP_METHOD: GOLDMANN APPLANATION
OD_IOP_MMHG: 18
OS_IOP_MMHG: 16

## 2024-06-17 ASSESSMENT — EXTERNAL EXAM - RIGHT EYE: OD_EXAM: NORMAL

## 2024-06-17 NOTE — PROGRESS NOTES
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 Monitor.    Regular astigmatism, [id number]  Hypermetropia of left eyeH52.02  - monitor    for now watch for cystoid macular edema (CME), TID, anterior chamber (AC) inflammation or other sec complications no surgery advised          Next available optometry  4m retina

## 2024-07-16 ENCOUNTER — APPOINTMENT (OUTPATIENT)
Dept: BEHAVIORAL HEALTH | Facility: CLINIC | Age: 84
End: 2024-07-16
Payer: MEDICARE

## 2024-07-16 VITALS — DIASTOLIC BLOOD PRESSURE: 68 MMHG | SYSTOLIC BLOOD PRESSURE: 142 MMHG | HEART RATE: 73 BPM

## 2024-07-16 DIAGNOSIS — F09 MILD COGNITIVE DISORDER: ICD-10-CM

## 2024-07-16 DIAGNOSIS — F41.8 DEPRESSION WITH ANXIETY: ICD-10-CM

## 2024-07-16 PROCEDURE — 1160F RVW MEDS BY RX/DR IN RCRD: CPT | Performed by: PSYCHIATRY & NEUROLOGY

## 2024-07-16 PROCEDURE — 1159F MED LIST DOCD IN RCRD: CPT | Performed by: PSYCHIATRY & NEUROLOGY

## 2024-07-16 PROCEDURE — 1123F ACP DISCUSS/DSCN MKR DOCD: CPT | Performed by: PSYCHIATRY & NEUROLOGY

## 2024-07-16 PROCEDURE — 99214 OFFICE O/P EST MOD 30 MIN: CPT | Performed by: PSYCHIATRY & NEUROLOGY

## 2024-07-16 PROCEDURE — 3078F DIAST BP <80 MM HG: CPT | Performed by: PSYCHIATRY & NEUROLOGY

## 2024-07-16 PROCEDURE — 1157F ADVNC CARE PLAN IN RCRD: CPT | Performed by: PSYCHIATRY & NEUROLOGY

## 2024-07-16 PROCEDURE — 3077F SYST BP >= 140 MM HG: CPT | Performed by: PSYCHIATRY & NEUROLOGY

## 2024-07-16 RX ORDER — DULOXETIN HYDROCHLORIDE 20 MG/1
20 CAPSULE, DELAYED RELEASE ORAL DAILY
Qty: 30 CAPSULE | Refills: 1 | Status: SHIPPED | OUTPATIENT
Start: 2024-07-16 | End: 2024-09-14

## 2024-07-16 RX ORDER — MIRTAZAPINE 45 MG/1
45 TABLET, FILM COATED ORAL NIGHTLY
Qty: 30 TABLET | Refills: 3 | Status: SHIPPED | OUTPATIENT
Start: 2024-07-16 | End: 2024-11-13

## 2024-07-16 ASSESSMENT — ENCOUNTER SYMPTOMS
HALLUCINATIONS: 0
HYPERACTIVE: 0
APPETITE CHANGE: 0
NERVOUS/ANXIOUS: 1
AGITATION: 0
CONFUSION: 0
FATIGUE: 1
SLEEP DISTURBANCE: 0
DECREASED CONCENTRATION: 1
DYSPHORIC MOOD: 1

## 2024-07-16 NOTE — PROGRESS NOTES
"Subjective   Patient ID: Hafsa Wilson is a 84 y.o. female who presents for follow up  Last giuliano, we ordered neuropsychological testing, continued Mirtazapine and suggested better blood pressure control before adding SNRI for depression   PCP started Lisinopril  Stated she has been taking her blood pressure meds. Blood pressure is 142/68  Sadness, down for months.    Sleep is ok no thoughts of suicide.   Memory is \" not great\". Not worsening. She stated she does not have issues taking her meds, daughter brings meals. No issues driving, not getting lost. No hallucinations   Neuropsychological testing is scheduled in September   Not participating in any activities in her senior complex   She struggles with motivation       Patient Active Problem List   Diagnosis    Adenomatous polyp of colon    Anemia in stage 3a chronic kidney disease (Multi)    Bilateral pseudophakia    Bilateral sensorineural hearing loss    Depression with anxiety    Early dry stage nonexudative age-related macular degeneration of both eyes    Epiretinal membrane (ERM) of right eye    Hearing loss of right ear    Hypermetropia of left eye    Hypothyroidism    Meibomian gland dysfunction (MGD) of upper and lower eyelid of left eye    Meibomian gland dysfunction (MGD) of upper and lower eyelid of right eye    Mild cognitive impairment    Myopia of both eyes    Myopic retinopathy of both eyes    Nevus    Nicotine dependence, unspecified, uncomplicated    Oral mucosal lesion    Osteopenia    Weight loss    Primary hypertension    Pseudophakia of left eye    Pseudophakia of right eye    Neck pain, chronic    Decreased ROM of neck    Anxiety    Dizziness and giddiness    Impacted cerumen    Mild cognitive disorder    Myopia    Olecranon bursitis of left elbow    Onychomycosis    Palpitations    Smokes tobacco daily    Supraventricular tachycardia (CMS-HCC)    Traumatic injury      Review of Systems   Constitutional:  Positive for fatigue. Negative " for appetite change.   Musculoskeletal:  Negative for gait problem.   Psychiatric/Behavioral:  Positive for decreased concentration and dysphoric mood. Negative for agitation, behavioral problems, confusion, hallucinations, self-injury, sleep disturbance and suicidal ideas. The patient is nervous/anxious. The patient is not hyperactive.        Objective   Physical Exam  Psychiatric:         Attention and Perception: Attention normal.         Mood and Affect: Mood is depressed.         Speech: Speech normal.         Behavior: Behavior normal. Behavior is cooperative.         Cognition and Memory: Cognition is impaired.         Judgment: Judgment normal.       There were no vitals filed for this visit.   Lab on 02/20/2024   Component Date Value    Vitamin B12 02/20/2024 225     Syphilis Total Ab 02/20/2024 Nonreactive      Lab Results   Component Value Date     11/16/2023     02/22/2023     04/18/2022     02/13/2021    K 3.8 11/16/2023    K 4.6 02/22/2023    K 4.0 04/18/2022    K 4.2 02/13/2021    CO2 28 11/16/2023    CO2 32 02/22/2023    CO2 33 (H) 04/18/2022    CO2 31 02/13/2021    BUN 10 11/16/2023    BUN 17 02/22/2023    BUN 16 04/18/2022    BUN 21 02/13/2021    CREATININE 0.91 11/16/2023    CREATININE 0.98 02/22/2023    CREATININE 1.16 (H) 04/18/2022    CREATININE 1.15 (H) 02/13/2021    GLUCOSE 90 11/16/2023    GLUCOSE 82 02/22/2023    GLUCOSE 86 04/18/2022    GLUCOSE 75 02/13/2021    CALCIUM 9.4 11/16/2023    CALCIUM 10.4 (H) 02/22/2023    CALCIUM 10.1 04/18/2022    CALCIUM 10.0 02/13/2021     Lab Results   Component Value Date    WBC 8.1 11/16/2023    HGB 13.7 11/16/2023    HCT 42.1 11/16/2023    MCV 96 11/16/2023     11/16/2023     Lab Results   Component Value Date    CHOL 163 02/22/2023    TRIG 84 02/22/2023    HDL 66.7 02/22/2023     Patient Active Problem List   Diagnosis    Adenomatous polyp of colon    Anemia in stage 3a chronic kidney disease (Multi)    Bilateral  pseudophakia    Bilateral sensorineural hearing loss    Depression with anxiety    Early dry stage nonexudative age-related macular degeneration of both eyes    Epiretinal membrane (ERM) of right eye    Hearing loss of right ear    Hypermetropia of left eye    Hypothyroidism    Meibomian gland dysfunction (MGD) of upper and lower eyelid of left eye    Meibomian gland dysfunction (MGD) of upper and lower eyelid of right eye    Mild cognitive impairment    Myopia of both eyes    Myopic retinopathy of both eyes    Nevus    Nicotine dependence, unspecified, uncomplicated    Oral mucosal lesion    Osteopenia    Weight loss    Primary hypertension    Pseudophakia of left eye    Pseudophakia of right eye    Neck pain, chronic    Decreased ROM of neck    Anxiety    Dizziness and giddiness    Impacted cerumen    Mild cognitive disorder    Myopia    Olecranon bursitis of left elbow    Onychomycosis    Palpitations    Smokes tobacco daily    Supraventricular tachycardia (CMS-HCC)    Traumatic injury      Assessment/Plan   Problem List Items Addressed This Visit             ICD-10-CM    Depression with anxiety F41.8    Mild cognitive disorder F09     Continue Mirtazapine 45 mg po at bedtime and add Cymbalta 20 mg po daily    Encouraged to go to neuropsychological testing   Encouraged physical, mental and social activities   Follow up in 1-2 months or sooner if needed

## 2024-08-19 ENCOUNTER — APPOINTMENT (OUTPATIENT)
Dept: PRIMARY CARE | Facility: CLINIC | Age: 84
End: 2024-08-19
Payer: MEDICARE

## 2024-08-19 VITALS
HEART RATE: 69 BPM | HEIGHT: 67 IN | OXYGEN SATURATION: 99 % | DIASTOLIC BLOOD PRESSURE: 80 MMHG | BODY MASS INDEX: 15.1 KG/M2 | WEIGHT: 96.2 LBS | SYSTOLIC BLOOD PRESSURE: 132 MMHG

## 2024-08-19 DIAGNOSIS — I10 PRIMARY HYPERTENSION: Primary | ICD-10-CM

## 2024-08-19 DIAGNOSIS — F41.8 DEPRESSION WITH ANXIETY: ICD-10-CM

## 2024-08-19 DIAGNOSIS — E03.9 ACQUIRED HYPOTHYROIDISM: ICD-10-CM

## 2024-08-19 DIAGNOSIS — G89.29 CHRONIC PAIN OF RIGHT KNEE: ICD-10-CM

## 2024-08-19 DIAGNOSIS — M25.561 CHRONIC PAIN OF RIGHT KNEE: ICD-10-CM

## 2024-08-19 PROBLEM — I47.10 SUPRAVENTRICULAR TACHYCARDIA (CMS-HCC): Status: RESOLVED | Noted: 2024-01-26 | Resolved: 2024-08-19

## 2024-08-19 PROCEDURE — 1159F MED LIST DOCD IN RCRD: CPT | Performed by: INTERNAL MEDICINE

## 2024-08-19 PROCEDURE — G2211 COMPLEX E/M VISIT ADD ON: HCPCS | Performed by: INTERNAL MEDICINE

## 2024-08-19 PROCEDURE — 3078F DIAST BP <80 MM HG: CPT | Performed by: INTERNAL MEDICINE

## 2024-08-19 PROCEDURE — 1157F ADVNC CARE PLAN IN RCRD: CPT | Performed by: INTERNAL MEDICINE

## 2024-08-19 PROCEDURE — 1123F ACP DISCUSS/DSCN MKR DOCD: CPT | Performed by: INTERNAL MEDICINE

## 2024-08-19 PROCEDURE — 99214 OFFICE O/P EST MOD 30 MIN: CPT | Performed by: INTERNAL MEDICINE

## 2024-08-19 PROCEDURE — 3075F SYST BP GE 130 - 139MM HG: CPT | Performed by: INTERNAL MEDICINE

## 2024-08-19 RX ORDER — DICLOFENAC SODIUM 10 MG/G
4 GEL TOPICAL 4 TIMES DAILY
Qty: 100 G | Refills: 1 | Status: SHIPPED | OUTPATIENT
Start: 2024-08-19

## 2024-08-19 ASSESSMENT — PATIENT HEALTH QUESTIONNAIRE - PHQ9
SUM OF ALL RESPONSES TO PHQ9 QUESTIONS 1 AND 2: 1
1. LITTLE INTEREST OR PLEASURE IN DOING THINGS: SEVERAL DAYS
2. FEELING DOWN, DEPRESSED OR HOPELESS: NOT AT ALL
10. IF YOU CHECKED OFF ANY PROBLEMS, HOW DIFFICULT HAVE THESE PROBLEMS MADE IT FOR YOU TO DO YOUR WORK, TAKE CARE OF THINGS AT HOME, OR GET ALONG WITH OTHER PEOPLE: NOT DIFFICULT AT ALL

## 2024-08-19 ASSESSMENT — ENCOUNTER SYMPTOMS
SHORTNESS OF BREATH: 0
ACTIVITY CHANGE: 0
APPETITE CHANGE: 0
COUGH: 0
PALPITATIONS: 0

## 2024-08-19 NOTE — PROGRESS NOTES
Subjective   Patient ID: Hafsa Wilson is a 84 y.o. female who presents for 3 month follow up and Hypertension.  HPI  Here with her daughter Serenity for follow up  Feels well  Has lost 3lbs   Daughter says she does not like the boost - so not drinking as much as recommended  She does drink it occ and eats some nuts    No side effects with the Lisinopril  Saw Psychiatrist who added duloxetine to her Remeron - she reports she is taking    C/o left knee pain  with activity  [place]evieve with yard work    Has a mole on her back -  Still driving  Review of Systems   Constitutional:  Negative for activity change and appetite change.   Respiratory:  Negative for cough and shortness of breath.    Cardiovascular:  Negative for chest pain, palpitations and leg swelling.       Objective   Vitals:    08/19/24 1406   BP: 132/80   Pulse:    SpO2:      Physical Exam  Vitals and nursing note reviewed.   Cardiovascular:      Rate and Rhythm: Normal rate and regular rhythm.   Pulmonary:      Effort: Pulmonary effort is normal.      Breath sounds: Normal breath sounds.     Back - mid back stuck on papule      Assessment & Plan  Primary hypertension  Stable       Acquired hypothyroidism  On Levothyroxine        Depression with anxiety  Per psychiatry     Knee pain - likely OA - discussed course - diclofenac and if no better rec Xray      Chronic pain of right knee    Orders:    XR knee right 3 views; Future    diclofenac sodium (Voltaren) 1 % gel; Apply 4.5 inches (4 g) topically 4 times a day.    Flu and Covid recommended   Follow up with me in 4 months

## 2024-08-19 NOTE — ASSESSMENT & PLAN NOTE
Per psychiatry     Knee pain - likely OA - discussed course - diclofenac and if no better rec Xray

## 2024-09-09 DIAGNOSIS — E03.9 ACQUIRED HYPOTHYROIDISM: ICD-10-CM

## 2024-09-09 RX ORDER — LEVOTHYROXINE SODIUM 100 UG/1
100 TABLET ORAL DAILY
Qty: 90 TABLET | Refills: 1 | Status: SHIPPED | OUTPATIENT
Start: 2024-09-09

## 2024-09-16 ENCOUNTER — OFFICE VISIT (OUTPATIENT)
Dept: PRIMARY CARE | Facility: CLINIC | Age: 84
End: 2024-09-16
Payer: MEDICARE

## 2024-09-16 ENCOUNTER — HOSPITAL ENCOUNTER (OUTPATIENT)
Dept: RADIOLOGY | Facility: CLINIC | Age: 84
Discharge: HOME | End: 2024-09-16
Payer: MEDICARE

## 2024-09-16 VITALS
SYSTOLIC BLOOD PRESSURE: 168 MMHG | WEIGHT: 96.6 LBS | HEART RATE: 54 BPM | BODY MASS INDEX: 15.16 KG/M2 | HEIGHT: 67 IN | OXYGEN SATURATION: 99 % | DIASTOLIC BLOOD PRESSURE: 70 MMHG

## 2024-09-16 DIAGNOSIS — M25.561 ACUTE PAIN OF RIGHT KNEE: Primary | ICD-10-CM

## 2024-09-16 DIAGNOSIS — M25.561 ACUTE PAIN OF RIGHT KNEE: ICD-10-CM

## 2024-09-16 PROCEDURE — 1123F ACP DISCUSS/DSCN MKR DOCD: CPT | Performed by: NURSE PRACTITIONER

## 2024-09-16 PROCEDURE — 3077F SYST BP >= 140 MM HG: CPT | Performed by: NURSE PRACTITIONER

## 2024-09-16 PROCEDURE — 73560 X-RAY EXAM OF KNEE 1 OR 2: CPT | Mod: RIGHT SIDE | Performed by: RADIOLOGY

## 2024-09-16 PROCEDURE — 1157F ADVNC CARE PLAN IN RCRD: CPT | Performed by: NURSE PRACTITIONER

## 2024-09-16 PROCEDURE — 99213 OFFICE O/P EST LOW 20 MIN: CPT | Performed by: NURSE PRACTITIONER

## 2024-09-16 PROCEDURE — 1159F MED LIST DOCD IN RCRD: CPT | Performed by: NURSE PRACTITIONER

## 2024-09-16 PROCEDURE — 3078F DIAST BP <80 MM HG: CPT | Performed by: NURSE PRACTITIONER

## 2024-09-16 PROCEDURE — 73560 X-RAY EXAM OF KNEE 1 OR 2: CPT | Mod: RT

## 2024-09-16 NOTE — PROGRESS NOTES
"Subjective   Patient ID: Hafsa Wilson is a 84 y.o. female who presents for Knee Pain (Right knee, patient had fall last week. Currently taking Naproxen. ).    She fell 1 week ago on Sunday. She fell over a foot stool and landed on her knees but has been having right knee pain.  Right knee has been hurting. Is swollen and bruised.     Knee Pain          Review of Systems  otherwise negative aside from what was mentioned above in HPI.    Objective   /70 (BP Location: Right arm, Patient Position: Sitting, BP Cuff Size: Adult)   Pulse 54   Ht 1.702 m (5' 7\")   Wt (!) 43.8 kg (96 lb 9.6 oz)   SpO2 99%   BMI 15.13 kg/m²     Physical Exam  Constitutional:       Appearance: Normal appearance.   Cardiovascular:      Rate and Rhythm: Normal rate.   Pulmonary:      Effort: Pulmonary effort is normal.   Musculoskeletal:         General: Swelling, tenderness and signs of injury present. No deformity. Normal range of motion.   Skin:     Findings: Bruising present.   Neurological:      General: No focal deficit present.      Mental Status: She is alert and oriented to person, place, and time. Mental status is at baseline.   Psychiatric:         Mood and Affect: Mood normal.         Behavior: Behavior normal.         Thought Content: Thought content normal.         Judgment: Judgment normal.         Assessment/Plan   Problem List Items Addressed This Visit    None  Visit Diagnoses         Codes    Acute pain of right knee    -  Primary M25.561    Relevant Orders    XR knee right 1-2 views          Continue with ICE and Naproxen. Will call after XR to review results     "

## 2024-09-17 ENCOUNTER — APPOINTMENT (OUTPATIENT)
Dept: BEHAVIORAL HEALTH | Facility: CLINIC | Age: 84
End: 2024-09-17
Payer: MEDICARE

## 2024-09-18 ENCOUNTER — TELEPHONE (OUTPATIENT)
Dept: PRIMARY CARE | Facility: CLINIC | Age: 84
End: 2024-09-18

## 2024-09-18 ENCOUNTER — OFFICE VISIT (OUTPATIENT)
Dept: ORTHOPEDIC SURGERY | Facility: CLINIC | Age: 84
End: 2024-09-18
Payer: MEDICARE

## 2024-09-18 VITALS — HEIGHT: 66 IN | BODY MASS INDEX: 15.59 KG/M2 | WEIGHT: 97 LBS

## 2024-09-18 DIAGNOSIS — S82.046A: Primary | ICD-10-CM

## 2024-09-18 DIAGNOSIS — S82.091A: Primary | ICD-10-CM

## 2024-09-18 PROCEDURE — 27520 TREAT KNEECAP FRACTURE: CPT | Performed by: INTERNAL MEDICINE

## 2024-09-18 PROCEDURE — 99213 OFFICE O/P EST LOW 20 MIN: CPT | Mod: 57,25 | Performed by: INTERNAL MEDICINE

## 2024-09-18 PROCEDURE — L1833 KO ADJ JNT POS R SUP PRE OTS: HCPCS | Performed by: INTERNAL MEDICINE

## 2024-09-18 PROCEDURE — 99203 OFFICE O/P NEW LOW 30 MIN: CPT | Performed by: INTERNAL MEDICINE

## 2024-09-18 PROCEDURE — 1159F MED LIST DOCD IN RCRD: CPT | Performed by: INTERNAL MEDICINE

## 2024-09-18 PROCEDURE — 1157F ADVNC CARE PLAN IN RCRD: CPT | Performed by: INTERNAL MEDICINE

## 2024-09-18 PROCEDURE — 1123F ACP DISCUSS/DSCN MKR DOCD: CPT | Performed by: INTERNAL MEDICINE

## 2024-09-18 ASSESSMENT — PATIENT HEALTH QUESTIONNAIRE - PHQ9
1. LITTLE INTEREST OR PLEASURE IN DOING THINGS: NOT AT ALL
2. FEELING DOWN, DEPRESSED OR HOPELESS: NOT AT ALL
SUM OF ALL RESPONSES TO PHQ9 QUESTIONS 1 AND 2: 0

## 2024-09-18 NOTE — TELEPHONE ENCOUNTER
Patient said that they can see Dr. Dubois next Tuesday or Thursday. They could get into fracture clinic today or tomm. What would  Recommend? Serenity/[phone]

## 2024-09-18 NOTE — PROGRESS NOTES
Acute Injury New Patient Visit    CC:   Chief Complaint   Patient presents with    Right Knee - Pain     Pain  in right knee, fell at [employer] on 09/08/24, xrays at [facility]        HPI: Hafsa is a 84 y.o. female presents today for evaluation for acute right knee injury sustained last week after she fell at home and fell directly onto her knee.  She had x-rays taken yesterday and was told she had a fracture . She is here for initial evaluation.         Review of Systems   GENERAL: Negative for malaise, significant weight loss, fever  MUSCULOSKELETAL: See HPI  NEURO:  Negative for numbness / tingling     Past Medical History  Past Medical History:   Diagnosis Date    Abdominal distension (gaseous) 03/12/2013    Abdominal distention    Contusion of left lower leg, initial encounter 07/30/2015    Contusion of leg, left    Displacement of intraocular lens, initial encounter 10/17/2022    Dislocated intraocular lens    Encounter for examination of eyes and vision without abnormal findings     Encounter for ophthalmic examination and evaluation    Encounter for gynecological examination (general) (routine) without abnormal findings     Encounter for gynecological examination without abnormal finding    Encounter for screening for cardiovascular disorders     Encounter for screening for cardiovascular disorders    Epiretinal membrane (ERM) of right eye     Impacted cerumen, right ear 04/16/2021    Impacted cerumen of right ear    Ingrowing nail 04/26/2021    Ingrown toenail    Macular RPE mottling     Nail dystrophy 07/25/2018    Dystrophic nail    Other conditions influencing health status     X-Ray    Other conditions influencing health status 11/08/2013    Foot pain, unspecified laterality    Other specified anxiety disorders     Depression with anxiety    Pain in left toe(s) 04/26/2021    Chronic pain of toe of left foot    Pain in unspecified knee     Joint pain, knee    Palpitations     Palpitations    Personal  history of other diseases of the circulatory system     History of hypertension    Personal history of other diseases of the musculoskeletal system and connective tissue 09/16/2021    History of neck pain    Personal history of other diseases of the musculoskeletal system and connective tissue     History of osteopenia    Personal history of other diseases of the respiratory system 09/21/2015    History of acute bronchitis    Personal history of other endocrine, nutritional and metabolic disease     History of hypothyroidism    Personal history of other infectious and parasitic diseases     History of herpes zoster    Personal history of other infectious and parasitic diseases 04/26/2021    History of onychomycosis    Personal history of other specified conditions     History of epistaxis    Personal history of other specified conditions     History of fatigue    Personal history of other specified conditions 11/01/2018    History of breast lump    Rash and other nonspecific skin eruption 09/04/2018    Rash    Supraventricular tachycardia (CMS-HCC)     Supraventricular tachycardia    Syncope and collapse 03/08/2017    Syncope and collapse    Toxic effect of venom of bees, accidental (unintentional), initial encounter 07/21/2018    Bee sting    Unspecified abnormal findings in urine     Malodorous urine       Medication review  Medication Documentation Review Audit       Reviewed by Therese Santiago MA (Medical Assistant) on 09/18/24 at 1555      Medication Order Taking? Sig Documenting Provider Last Dose Status   aspirin 81 mg EC tablet [id number] No Take 1 tablet (81 mg) by mouth once daily. Historical Provider, MD Taking Active   calcium carbonate-vitamin D3 600 mg-5 mcg (200 unit) tablet [id number] No Take 2 tablets by mouth once daily. Historical Provider, MD Taking Active   desoximetasone (Topicort) 0.25 % ointment [id number] No Apply 1 Application topically twice a day. APPLY AND GENTLY MASSAGE INTO AFFECTED AREA(S)  TWICE DAILY. Jonny Guzman MD Taking Active   diclofenac sodium (Voltaren) 1 % gel [id number] No Apply 4.5 inches (4 g) topically 4 times a day. Alie Weiss MD Taking Active   DULoxetine (Cymbalta) 20 mg DR capsule [id number] No TAKE 1 CAPSULE BY MOUTH EVERY DAY DO NOT CRUSH, CHEW, OR SPLIT Zunilda Daniels MD Taking Active   levothyroxine (Synthroid, Levoxyl) 100 mcg tablet [id number] No Take 1 tablet (100 mcg) by mouth once daily. Alie Weiss MD Taking Active   lisinopril 10 mg tablet [id number] No Take 1 tablet (10 mg) by mouth once daily. Alie Weiss MD Taking Active   mirtazapine (Remeron) 45 mg tablet [id number] No Take 1 tablet (45 mg) by mouth once daily at bedtime. Zunilda Daniels MD Taking Active                    Allergies  Allergies   Allergen Reactions    Bee Venom Protein (Honey Bee) Unknown    Clindamycin Unknown    Escitalopram Oxalate Other     Fatigue    Iodinated Contrast Media Unknown    Iodine Unknown    Penicillins Unknown       Social History  Social History     Socioeconomic History    Marital status: Single     Spouse name: Not on file    Number of children: Not on file    Years of education: Not on file    Highest education level: Not on file   Occupational History    Not on file   Tobacco Use    Smoking status: Every Day     Current packs/day: 0.25     Types: Cigarettes    Smokeless tobacco: Never   Vaping Use    Vaping status: Never Used   Substance and Sexual Activity    Alcohol use: Yes     Comment: rare    Drug use: Never    Sexual activity: Not on file   Other Topics Concern    Not on file   Social History Narrative    Not on file     Social Determinants of Health     Financial Resource Strain: Not on file   Food Insecurity: Not on file   Transportation Needs: Not on file   Physical Activity: Not on file   Stress: Not on file   Social Connections: Not on file   Intimate Partner Violence: Not on file   Housing Stability: Not on file       Surgical History  Past  Surgical History:   Procedure Laterality Date    CATARACT EXTRACTION      COLONOSCOPY  12/20/2012    Complete Colonoscopy    OTHER SURGICAL HISTORY  12/20/2012    Superficial Cauterization Of Turbinate Mucosa       Physical Exam:  GENERAL:  Patient is awake, alert, and oriented to person place and time.  Patient appears well nourished and well kept.  Affect Calm, Not Acutely Distressed.  HEENT:  Normocephalic, Atraumatic, EOMI  CARDIOVASCULAR:  Hemodynamically stable.  RESPIRATORY:  Normal respirations with unlabored breathing.  Extremity: Right knee examination shows skin is intact.  There is no erythema or warmth.  No effusion.  Can flex the right knee to 90 degrees with pain.  Full extension at 0 degrees.  Pain over the patella.  No pain over the medial joint line.  No pain over the lateral joint line.  There is no pain over the patellar or quadricep tendon.  No pain over the proximal tibia.  No pain over the popliteal fossa.  Negative valgus stress test.  Negative varus stress test.  Negative Laureano's test medially with no instability.  Negative Laureano's test laterally with no instability.  Negative Lachman's test.  Patellar and quadricep mechanism intact.  Negative anterior and posterior drawer test.  Negative patellar apprehension test.  Distal pulses are palpable, neurovascularly intact.  Walking with no significant antalgic gait.      Diagnostics: X-rays reviewed  XR knee right 1-2 views  Narrative: Interpreted By:  Marquis Springer,   STUDY:  XR KNEE RIGHT 1-2 VIEWS; ;  9/16/2024 2:44 pm      INDICATION:  Signs/Symptoms:acute right knee pain following a fall.      ,M25.561 Pain in right knee      COMPARISON:  None.      ACCESSION NUMBER(S):  [id number]      ORDERING CLINICIAN:  BIANCA TAVAREZ      FINDINGS:  Right knee, two views      There is a comminuted fracture through the patella with mild  displacement. There is prepatellar soft tissue edema as well as a  small effusion. 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 There is severe chondrocalcinosis.      Impression: Comminuted fracture through the patella with mild displacement. Soft  tissue edema in the prepatellar region.          MACRO:  Critical Finding:  See findings. Notification was initiated on  9/17/2024 at 9:35 pm by  Marquis Springer.  (**-YCF-**)      Signed by: Marquis Springer 9/17/2024 9:35 PM  Dictation workstation:   [id number]      Procedure: None    Assessment: Acute comminuted nondisplaced patellar fracture    Plan: Hafsa presents today for initial evaluation for acute right knee injury sustained last week after a fall and sustained a comminuted nondisplaced patella fracture.  We recommended nonsurgical treatment, we placed her into a recovery knee brace set at 0-20 degrees.  She may weight-bear as tolerated, she will follow-up in 2 weeks, repeat x-rays of the right knee, 3 views, AP, lateral, and sunrise views.  If she is  clinically doing well, we will modify the brace to 0 to 45 degrees.    No orders of the defined types were placed in this encounter.     At the conclusion of the visit there were no further questions by the patient/family regarding their plan of care.  Patient was instructed to call or return with any issues, questions, or concerns regarding their injury and/or treatment plan described above.     09/18/24 at 4:02 PM - Cyn Mohr MD  Scribe Attestation  By signing my name below, I, Isrrael Tam, Scribe   attest that this documentation has been prepared under the direction and in the presence of Cyn Mohr MD.    Office: [phone]    This note was prepared using voice recognition software.  The details of this note are correct and have been reviewed, and corrected to the best of my ability.  Some grammatical errors may persist related to the Dragon software.

## 2024-09-18 NOTE — TELEPHONE ENCOUNTER
Serenity, pt daughter,called stating knee xray was on Monday and looking for results.    Please call pt at  [phone]

## 2024-09-18 NOTE — TELEPHONE ENCOUNTER
spoke to the patient daughter and advised patient should go to fracture clinic today or tomorrow.

## 2024-09-25 ENCOUNTER — HOSPITAL ENCOUNTER (OUTPATIENT)
Dept: RADIOLOGY | Facility: CLINIC | Age: 84
Discharge: HOME | End: 2024-09-25
Payer: MEDICARE

## 2024-09-25 ENCOUNTER — OFFICE VISIT (OUTPATIENT)
Dept: ORTHOPEDIC SURGERY | Facility: CLINIC | Age: 84
End: 2024-09-25
Payer: MEDICARE

## 2024-09-25 VITALS — HEIGHT: 66 IN | BODY MASS INDEX: 15.59 KG/M2 | WEIGHT: 97 LBS

## 2024-09-25 DIAGNOSIS — S82.091A: ICD-10-CM

## 2024-09-25 PROCEDURE — 1123F ACP DISCUSS/DSCN MKR DOCD: CPT | Performed by: INTERNAL MEDICINE

## 2024-09-25 PROCEDURE — 99211 OFF/OP EST MAY X REQ PHY/QHP: CPT | Performed by: INTERNAL MEDICINE

## 2024-09-25 PROCEDURE — 99024 POSTOP FOLLOW-UP VISIT: CPT | Performed by: INTERNAL MEDICINE

## 2024-09-25 PROCEDURE — 1159F MED LIST DOCD IN RCRD: CPT | Performed by: INTERNAL MEDICINE

## 2024-09-25 PROCEDURE — 73562 X-RAY EXAM OF KNEE 3: CPT | Mod: RT

## 2024-09-25 PROCEDURE — 1157F ADVNC CARE PLAN IN RCRD: CPT | Performed by: INTERNAL MEDICINE

## 2024-09-25 NOTE — PROGRESS NOTES
CC:   Chief Complaint   Patient presents with    Right Knee - Follow-up     Follow up acute comminuted non displaced patellar fracture, repeat xrays today.       HPI: Hafsa is a 84 y.o. female presents today for reevaluation for right comminuted nondisplaced patellar fracture. She states that she is doing better. She is wearing the recovery brace. Repeat x-rays today.          Review of Systems   GENERAL: Negative for malaise, significant weight loss, fever  MUSCULOSKELETAL: See HPI  NEURO:  Negative for numbness / tingling     Past Medical History  Past Medical History:   Diagnosis Date    Abdominal distension (gaseous) 03/12/2013    Abdominal distention    Contusion of left lower leg, initial encounter 07/30/2015    Contusion of leg, left    Displacement of intraocular lens, initial encounter 10/17/2022    Dislocated intraocular lens    Encounter for examination of eyes and vision without abnormal findings     Encounter for ophthalmic examination and evaluation    Encounter for gynecological examination (general) (routine) without abnormal findings     Encounter for gynecological examination without abnormal finding    Encounter for screening for cardiovascular disorders     Encounter for screening for cardiovascular disorders    Epiretinal membrane (ERM) of right eye     Impacted cerumen, right ear 04/16/2021    Impacted cerumen of right ear    Ingrowing nail 04/26/2021    Ingrown toenail    Macular RPE mottling     Nail dystrophy 07/25/2018    Dystrophic nail    Other conditions influencing health status     X-Ray    Other conditions influencing health status 11/08/2013    Foot pain, unspecified laterality    Other specified anxiety disorders     Depression with anxiety    Pain in left toe(s) 04/26/2021    Chronic pain of toe of left foot    Pain in unspecified knee     Joint pain, knee    Palpitations     Palpitations    Personal history of other diseases of the circulatory system     History of  hypertension    Personal history of other diseases of the musculoskeletal system and connective tissue 09/16/2021    History of neck pain    Personal history of other diseases of the musculoskeletal system and connective tissue     History of osteopenia    Personal history of other diseases of the respiratory system 09/21/2015    History of acute bronchitis    Personal history of other endocrine, nutritional and metabolic disease     History of hypothyroidism    Personal history of other infectious and parasitic diseases     History of herpes zoster    Personal history of other infectious and parasitic diseases 04/26/2021    History of onychomycosis    Personal history of other specified conditions     History of epistaxis    Personal history of other specified conditions     History of fatigue    Personal history of other specified conditions 11/01/2018    History of breast lump    Rash and other nonspecific skin eruption 09/04/2018    Rash    Supraventricular tachycardia (CMS-HCC)     Supraventricular tachycardia    Syncope and collapse 03/08/2017    Syncope and collapse    Toxic effect of venom of bees, accidental (unintentional), initial encounter 07/21/2018    Bee sting    Unspecified abnormal findings in urine     Malodorous urine       Medication review  Medication Documentation Review Audit       Reviewed by Juani Gordillo MA (Medical Assistant) on 09/25/24 at 1401      Medication Order Taking? Sig Documenting Provider Last Dose Status   aspirin 81 mg EC tablet [id number] No Take 1 tablet (81 mg) by mouth once daily. Historical Provider, MD Taking Active   calcium carbonate-vitamin D3 600 mg-5 mcg (200 unit) tablet [id number] No Take 2 tablets by mouth once daily. Historical Provider, MD Taking Active   desoximetasone (Topicort) 0.25 % ointment [id number] No Apply 1 Application topically twice a day. APPLY AND GENTLY MASSAGE INTO AFFECTED AREA(S) TWICE DAILY. Historical Provider, MD Taking Active   diclofenac  sodium (Voltaren) 1 % gel [id number] No Apply 4.5 inches (4 g) topically 4 times a day. Alie Weiss MD Taking Active   DULoxetine (Cymbalta) 20 mg DR capsule [id number] No TAKE 1 CAPSULE BY MOUTH EVERY DAY DO NOT CRUSH, CHEW, OR SPLIT Zunilda Daniels MD Taking Active   levothyroxine (Synthroid, Levoxyl) 100 mcg tablet [id number] No Take 1 tablet (100 mcg) by mouth once daily. Alie Weiss MD Taking Active   lisinopril 10 mg tablet [id number] No Take 1 tablet (10 mg) by mouth once daily. Alie Weiss MD Taking Active   mirtazapine (Remeron) 45 mg tablet [id number] No Take 1 tablet (45 mg) by mouth once daily at bedtime. Zunilda Daniels MD Taking Active                    Allergies  Allergies   Allergen Reactions    Bee Venom Protein (Honey Bee) Unknown    Clindamycin Unknown    Escitalopram Oxalate Other     Fatigue    Iodinated Contrast Media Unknown    Iodine Unknown    Penicillins Unknown       Social History  Social History     Socioeconomic History    Marital status: Single     Spouse name: Not on file    Number of children: Not on file    Years of education: Not on file    Highest education level: Not on file   Occupational History    Not on file   Tobacco Use    Smoking status: Every Day     Current packs/day: 0.25     Types: Cigarettes    Smokeless tobacco: Never   Vaping Use    Vaping status: Never Used   Substance and Sexual Activity    Alcohol use: Yes     Comment: rare    Drug use: Never    Sexual activity: Not on file   Other Topics Concern    Not on file   Social History Narrative    Not on file     Social Determinants of Health     Financial Resource Strain: Not on file   Food Insecurity: Not on file   Transportation Needs: Not on file   Physical Activity: Not on file   Stress: Not on file   Social Connections: Not on file   Intimate Partner Violence: Not on file   Housing Stability: Not on file       Surgical History  Past Surgical History:   Procedure Laterality Date    CATARACT EXTRACTION       COLONOSCOPY  12/20/2012    Complete Colonoscopy    OTHER SURGICAL HISTORY  12/20/2012    Superficial Cauterization Of Turbinate Mucosa       Physical Exam:  GENERAL:  Patient is awake, alert, and oriented to person place and time.  Patient appears well nourished and well kept.  Affect Calm, Not Acutely Distressed.  HEENT:  Normocephalic, Atraumatic, EOMI  CARDIOVASCULAR:  Hemodynamically stable.  RESPIRATORY:  Normal respirations with unlabored breathing.  Extremity:  Right knee examination shows skin is intact.  There is no erythema or warmth.  No effusion.  Can flex the right knee to 90 degrees with pain.  Full extension at 0 degrees.  Pain over the patella.  No pain over the medial joint line.  No pain over the lateral joint line.  There is no pain over the patellar or quadricep tendon.  No pain over the proximal tibia.  No pain over the popliteal fossa.  Negative valgus stress test.  Negative varus stress test.  Negative Laureano's test medially with no instability.  Negative Laureano's test laterally with no instability.  Negative Lachman's test.  Patellar and quadricep mechanism intact.  Negative anterior and posterior drawer test.  Negative patellar apprehension test.  Distal pulses are palpable, neurovascularly intact.  Walking with no significant antalgic gait.       Diagnostics: X-rays reviewed  XR knee right 1-2 views  Narrative: Interpreted By:  Marquis Springer,   STUDY:  XR KNEE RIGHT 1-2 VIEWS; ;  9/16/2024 2:44 pm      INDICATION:  Signs/Symptoms:acute right knee pain following a fall.      ,M25.561 Pain in right knee      COMPARISON:  None.      ACCESSION NUMBER(S):  [id number]      ORDERING CLINICIAN:  BIANCA TAVAREZ      FINDINGS:  Right knee, two views      There is a comminuted fracture through the patella with mild  displacement. There is prepatellar soft tissue edema as well as a  small effusion. There is milder compatible osteoarthritis in the  knee. There is severe chondrocalcinosis.       Impression: Comminuted fracture through the patella with mild displacement. Soft  tissue edema in the prepatellar region.          MACRO:  Critical Finding:  See findings. Notification was initiated on  9/17/2024 at 9:35 pm by  Marquis Springer.  (**-YCF-**)      Signed by: Marquis Springer 9/17/2024 9:35 PM  Dictation workstation:   [id number]        Procedure: None    Assessment: Right comminuted nondisplaced patellar fracture     Plan: Hafsa presents today for reevaluation for right comminuted nondisplaced patellar fracture. She is clinically doing better. X-rays showed stable healing fracture, no displacement.  She will continue with recovery brace locked from 0 to 20 degrees she will follow-up in 2 weeks, repeat x-rays of the right knee, 3 views, AP, lateral, and sunrise views. If clinically doing well, we will modify the brace to 0-45 degrees.    Orders Placed This Encounter    XR knee right 3 views      At the conclusion of the visit there were no further questions by the patient/family regarding their plan of care.  Patient was instructed to call or return with any issues, questions, or concerns regarding their injury and/or treatment plan described above.     09/25/24 at 2:24 PM - Cyn Mohr MD  Scribe Attestation  By signing my name below, I, Isrrael Adamsmo, Scribe   attest that this documentation has been prepared under the direction and in the presence of Cyn Mohr MD.    Office: [phone]    This note was prepared using voice recognition software.  The details of this note are correct and have been reviewed, and corrected to the best of my ability.  Some grammatical errors may persist related to the Dragon software.

## 2024-09-27 ENCOUNTER — APPOINTMENT (OUTPATIENT)
Dept: ORTHOPEDIC SURGERY | Facility: CLINIC | Age: 84
End: 2024-09-27
Payer: MEDICARE

## 2024-10-10 ENCOUNTER — OFFICE VISIT (OUTPATIENT)
Dept: ORTHOPEDIC SURGERY | Facility: CLINIC | Age: 84
End: 2024-10-10
Payer: MEDICARE

## 2024-10-10 ENCOUNTER — HOSPITAL ENCOUNTER (OUTPATIENT)
Dept: RADIOLOGY | Facility: CLINIC | Age: 84
Discharge: HOME | End: 2024-10-10
Payer: MEDICARE

## 2024-10-10 DIAGNOSIS — S82.091A: ICD-10-CM

## 2024-10-10 PROCEDURE — 73562 X-RAY EXAM OF KNEE 3: CPT | Mod: RT

## 2024-10-10 PROCEDURE — 99211 OFF/OP EST MAY X REQ PHY/QHP: CPT | Performed by: INTERNAL MEDICINE

## 2024-10-10 NOTE — PROGRESS NOTES
CC:   Chief Complaint   Patient presents with    Right Knee - Follow-up     Follow up acute comminuted non displaced patellar fracture, repeat xrays today.       HPI: Hafsa is a 84 y.o. female presents today for reevaluation for comminuted non displaced patellar fracture of the right knee. She states that she is doing better, some pain. Repeat x-rays today.  She is currently in recovery hinged knee brace locked in full extension.          Review of Systems   GENERAL: Negative for malaise, significant weight loss, fever  MUSCULOSKELETAL: See HPI  NEURO:  Negative for numbness / tingling     Past Medical History  Past Medical History:   Diagnosis Date    Abdominal distension (gaseous) 03/12/2013    Abdominal distention    Contusion of left lower leg, initial encounter 07/30/2015    Contusion of leg, left    Displacement of intraocular lens, initial encounter 10/17/2022    Dislocated intraocular lens    Encounter for examination of eyes and vision without abnormal findings     Encounter for ophthalmic examination and evaluation    Encounter for gynecological examination (general) (routine) without abnormal findings     Encounter for gynecological examination without abnormal finding    Encounter for screening for cardiovascular disorders     Encounter for screening for cardiovascular disorders    Epiretinal membrane (ERM) of right eye     Impacted cerumen, right ear 04/16/2021    Impacted cerumen of right ear    Ingrowing nail 04/26/2021    Ingrown toenail    Macular RPE mottling     Nail dystrophy 07/25/2018    Dystrophic nail    Other conditions influencing health status     X-Ray    Other conditions influencing health status 11/08/2013    Foot pain, unspecified laterality    Other specified anxiety disorders     Depression with anxiety    Pain in left toe(s) 04/26/2021    Chronic pain of toe of left foot    Pain in unspecified knee     Joint pain, knee    Palpitations     Palpitations    Personal history  of other diseases of the circulatory system     History of hypertension    Personal history of other diseases of the musculoskeletal system and connective tissue 09/16/2021    History of neck pain    Personal history of other diseases of the musculoskeletal system and connective tissue     History of osteopenia    Personal history of other diseases of the respiratory system 09/21/2015    History of acute bronchitis    Personal history of other endocrine, nutritional and metabolic disease     History of hypothyroidism    Personal history of other infectious and parasitic diseases     History of herpes zoster    Personal history of other infectious and parasitic diseases 04/26/2021    History of onychomycosis    Personal history of other specified conditions     History of epistaxis    Personal history of other specified conditions     History of fatigue    Personal history of other specified conditions 11/01/2018    History of breast lump    Rash and other nonspecific skin eruption 09/04/2018    Rash    Supraventricular tachycardia (CMS-HCC)     Supraventricular tachycardia    Syncope and collapse 03/08/2017    Syncope and collapse    Toxic effect of venom of bees, accidental (unintentional), initial encounter 07/21/2018    Bee sting    Unspecified abnormal findings in urine     Malodorous urine       Medication review  Medication Documentation Review Audit       Reviewed by Iris Gaytan MA (Medical Assistant) on 10/10/24 at 1036      Medication Order Taking? Sig Documenting Provider Last Dose Status   aspirin 81 mg EC tablet [id number] No Take 1 tablet (81 mg) by mouth once daily. Historical Provider, MD Taking Active   calcium carbonate-vitamin D3 600 mg-5 mcg (200 unit) tablet [id number] No Take 2 tablets by mouth once daily. Historical Provider, MD Taking Active   desoximetasone (Topicort) 0.25 % ointment [id number] No Apply 1 Application topically twice a day. APPLY AND GENTLY MASSAGE INTO AFFECTED AREA(S) TWICE  DAILY. Jonny Guzman MD Taking Active   diclofenac sodium (Voltaren) 1 % gel [id number] No Apply 4.5 inches (4 g) topically 4 times a day. Alie Weiss MD Taking Active   DULoxetine (Cymbalta) 20 mg DR capsule [id number] No TAKE 1 CAPSULE BY MOUTH EVERY DAY DO NOT CRUSH, CHEW, OR SPLIT Zunilda Daniels MD Taking Active   levothyroxine (Synthroid, Levoxyl) 100 mcg tablet [id number] No Take 1 tablet (100 mcg) by mouth once daily. Alie Weiss MD Taking Active   lisinopril 10 mg tablet [id number] No Take 1 tablet (10 mg) by mouth once daily. Alie Weiss MD Taking Active   mirtazapine (Remeron) 45 mg tablet [id number] No Take 1 tablet (45 mg) by mouth once daily at bedtime. Zunilda Daniels MD Taking Active                    Allergies  Allergies   Allergen Reactions    Bee Venom Protein (Honey Bee) Unknown    Clindamycin Unknown    Escitalopram Oxalate Other     Fatigue    Iodinated Contrast Media Unknown    Iodine Unknown    Penicillins Unknown       Social History  Social History     Socioeconomic History    Marital status: Single     Spouse name: Not on file    Number of children: Not on file    Years of education: Not on file    Highest education level: Not on file   Occupational History    Not on file   Tobacco Use    Smoking status: Every Day     Current packs/day: 0.25     Types: Cigarettes    Smokeless tobacco: Never   Vaping Use    Vaping status: Never Used   Substance and Sexual Activity    Alcohol use: Yes     Comment: rare    Drug use: Never    Sexual activity: Not on file   Other Topics Concern    Not on file   Social History Narrative    Not on file     Social Determinants of Health     Financial Resource Strain: Not on file   Food Insecurity: Not on file   Transportation Needs: Not on file   Physical Activity: Not on file   Stress: Not on file   Social Connections: Not on file   Intimate Partner Violence: Not on file   Housing Stability: Not on file       Surgical History  Past Surgical  History:   Procedure Laterality Date    CATARACT EXTRACTION      COLONOSCOPY  12/20/2012    Complete Colonoscopy    OTHER SURGICAL HISTORY  12/20/2012    Superficial Cauterization Of Turbinate Mucosa       Physical Exam:  GENERAL:  Patient is awake, alert, and oriented to person place and time.  Patient appears well nourished and well kept.  Affect Calm, Not Acutely Distressed.  HEENT:  Normocephalic, Atraumatic, EOMI  CARDIOVASCULAR:  Hemodynamically stable.  RESPIRATORY:  Normal respirations with unlabored breathing.  Extremity: Right knee examination shows skin is intact. There is no erythema or warmth. No effusion. Can flex the right knee to 90 degrees with minimal pain. Full extension at 0 degrees.  Minimal pain over the patella. No pain over the medial joint line. No pain over the lateral joint line. There is no pain over the patellar or quadricep tendon. No pain over the proximal tibia. No pain over the popliteal fossa. Negative valgus stress test. Negative varus stress test. Negative Laureano's test medially with no instability. Negative Laureano's test laterally with no instability. Negative Lachman's test. Patellar and quadricep mechanism intact. Negative anterior and posterior drawer test. Negative patellar apprehension test. Distal pulses are palpable, neurovascularly intact. Walking with no significant antalgic gait.       Diagnostics: X-rays reviewed  XR knee right 3 views  Interpreted By:  Cyn Ferrari,   STUDY:  XR KNEE RIGHT 3 VIEWS, 9/25/2024 2:18 pm      INDICATION:  Signs/Symptoms:pain      ACCESSION NUMBER(S):  [id number]      ORDERING CLINICIAN:  CYN FERRARI      FINDINGS:  Right knee x-rays three views AP, lateral and sunrise view: Stable  appearing minimally displaced comminuted patellar fracture, with no  significant change from previous imaging.          Signed by: Cyn Ferrari 9/25/2024 5:17 PM  Dictation workstation:   [id number]        Procedure: None    Assessment:  Right  comminuted nondisplaced patellar fracture     Plan: Hafsa presents today for reevaluation for right comminuted nondisplaced patellar fracture. She is clinically doing better. X-rays showed stable healing fracture, with good callus formation.  We will modify her recovery hinged knee brace to 0 to 45 degrees. She will follow-up in 2-3 weeks, repeat x-rays of the right knee, 3 views, AP, lateral, and sunrise views.  If she is clinically doing well we may modify the recovery brace to 0 to 90 degrees.    Orders Placed This Encounter    XR knee right 3 views      At the conclusion of the visit there were no further questions by the patient/family regarding their plan of care.  Patient was instructed to call or return with any issues, questions, or concerns regarding their injury and/or treatment plan described above.     10/10/24 at 10:49 AM - Cyn Mohr MD  Scribe Attestation  By signing my name below, I Isrrael Adamsmo, Scribe   attest that this documentation has been prepared under the direction and in the presence of Cyn Mohr MD.    Office: [phone]    This note was prepared using voice recognition software.  The details of this note are correct and have been reviewed, and corrected to the best of my ability.  Some grammatical errors may persist related to the Dragon software.

## 2024-10-21 ENCOUNTER — APPOINTMENT (OUTPATIENT)
Dept: OPHTHALMOLOGY | Facility: CLINIC | Age: 84
End: 2024-10-21
Payer: MEDICARE

## 2024-10-21 DIAGNOSIS — H35.371 EPIRETINAL MEMBRANE (ERM) OF RIGHT EYE: ICD-10-CM

## 2024-10-21 DIAGNOSIS — H35.89 RPE MOTTLING OF MACULA: ICD-10-CM

## 2024-10-21 DIAGNOSIS — H52.02 HYPERMETROPIA OF LEFT EYE: ICD-10-CM

## 2024-10-21 DIAGNOSIS — H35.3131 EARLY DRY STAGE NONEXUDATIVE AGE-RELATED MACULAR DEGENERATION OF BOTH EYES: ICD-10-CM

## 2024-10-21 DIAGNOSIS — H35.30 ARMD (AGE-RELATED MACULAR DEGENERATION), BILATERAL: Primary | ICD-10-CM

## 2024-10-21 DIAGNOSIS — H02.88B MEIBOMIAN GLAND DYSFUNCTION (MGD) OF UPPER AND LOWER EYELID OF LEFT EYE: ICD-10-CM

## 2024-10-21 PROCEDURE — 92134 CPTRZ OPH DX IMG PST SGM RTA: CPT | Mod: BILATERAL PROCEDURE | Performed by: OPHTHALMOLOGY

## 2024-10-21 PROCEDURE — 99213 OFFICE O/P EST LOW 20 MIN: CPT | Performed by: OPHTHALMOLOGY

## 2024-10-21 ASSESSMENT — VISUAL ACUITY
OD_CC: 20/40
OS_CC: 20/25
CORRECTION_TYPE: GLASSES
METHOD: SNELLEN - LINEAR

## 2024-10-21 ASSESSMENT — ENCOUNTER SYMPTOMS
NEUROLOGICAL NEGATIVE: 0
ENDOCRINE NEGATIVE: 0
CARDIOVASCULAR NEGATIVE: 0
PSYCHIATRIC NEGATIVE: 0
RESPIRATORY NEGATIVE: 0
ALLERGIC/IMMUNOLOGIC NEGATIVE: 0
HEMATOLOGIC/LYMPHATIC NEGATIVE: 0
CONSTITUTIONAL NEGATIVE: 0
MUSCULOSKELETAL NEGATIVE: 0
EYES NEGATIVE: 1
GASTROINTESTINAL NEGATIVE: 0

## 2024-10-21 ASSESSMENT — EXTERNAL EXAM - RIGHT EYE: OD_EXAM: NORMAL

## 2024-10-21 ASSESSMENT — REFRACTION_WEARINGRX
OD_AXIS: 095
OS_CYLINDER: -0.50
OS_AXIS: 070
OS_SPHERE: +0.50
OD_SPHERE: -1.00
OD_CYLINDER: -0.50
OD_ADD: +2.50
OS_ADD: +2.50

## 2024-10-21 ASSESSMENT — TONOMETRY
OD_IOP_MMHG: 16
IOP_METHOD: GOLDMANN APPLANATION
OS_IOP_MMHG: 14

## 2024-10-21 ASSESSMENT — CUP TO DISC RATIO
OS_RATIO: 0.1
OD_RATIO: 0.1

## 2024-10-21 ASSESSMENT — SLIT LAMP EXAM - LIDS
COMMENTS: GOOD POSITION
COMMENTS: GOOD POSITION

## 2024-10-21 ASSESSMENT — EXTERNAL EXAM - LEFT EYE: OS_EXAM: NORMAL

## 2024-10-21 NOTE — PROGRESS NOTES
Assessment/Plan   Diagnoses and all orders for this visit:  ARMD (age-related macular degeneration), bilateral  -     OCT, Retina - OU - Both Eyes  RPE mottling of macula  -     OCT, Retina - OU - Both Eyes  Epiretinal membrane (ERM) of right eye  Early dry stage nonexudative age-related macular degeneration of both eyes  Hypermetropia of left eye  Meibomian gland dysfunction (MGD) of upper and lower eyelid of left eye      Discussion    Dislocated intraocular lensT85.22XA  the lens is very slightly off axis  this should be correctable with surgery  Will defer surgery    Macular RPE [id number]  Early dry stage nonexudative age-related macular degeneration of both eyesH35.3131  Discussed imporantance of sun protection outdoors, not smoking, healthy diet and exercise. Monitor.    Regular astigmatism, [id number]  Hypermetropia of left eyeH52.02  - monitor    for now watch for cystoid macular edema (CME), TID, anterior chamber (AC) inflammation or other sec complications no surgery advised          Next available optometry OPINION  LENS IS STABLE RIGHT   4m retina

## 2024-10-31 ENCOUNTER — HOSPITAL ENCOUNTER (OUTPATIENT)
Dept: RADIOLOGY | Facility: CLINIC | Age: 84
Discharge: HOME | End: 2024-10-31
Payer: MEDICARE

## 2024-10-31 ENCOUNTER — OFFICE VISIT (OUTPATIENT)
Dept: ORTHOPEDIC SURGERY | Facility: CLINIC | Age: 84
End: 2024-10-31
Payer: MEDICARE

## 2024-10-31 DIAGNOSIS — S82.091A: ICD-10-CM

## 2024-10-31 PROCEDURE — 99211 OFF/OP EST MAY X REQ PHY/QHP: CPT | Performed by: INTERNAL MEDICINE

## 2024-10-31 PROCEDURE — 1123F ACP DISCUSS/DSCN MKR DOCD: CPT | Performed by: INTERNAL MEDICINE

## 2024-10-31 PROCEDURE — 1157F ADVNC CARE PLAN IN RCRD: CPT | Performed by: INTERNAL MEDICINE

## 2024-10-31 PROCEDURE — 1159F MED LIST DOCD IN RCRD: CPT | Performed by: INTERNAL MEDICINE

## 2024-10-31 PROCEDURE — 73562 X-RAY EXAM OF KNEE 3: CPT | Mod: RT

## 2024-10-31 PROCEDURE — 99024 POSTOP FOLLOW-UP VISIT: CPT | Performed by: INTERNAL MEDICINE

## 2024-11-11 ENCOUNTER — APPOINTMENT (OUTPATIENT)
Dept: PHYSICAL THERAPY | Facility: CLINIC | Age: 84
End: 2024-11-11
Payer: MEDICARE

## 2024-11-12 ENCOUNTER — APPOINTMENT (OUTPATIENT)
Dept: PSYCHOLOGY | Facility: CLINIC | Age: 84
End: 2024-11-12
Payer: MEDICARE

## 2024-11-12 DIAGNOSIS — R41.89 COGNITIVE DECLINE: ICD-10-CM

## 2024-11-12 DIAGNOSIS — R41.3 MEMORY DIFFICULTY: Primary | ICD-10-CM

## 2024-11-12 DIAGNOSIS — F32.A DEPRESSION, UNSPECIFIED DEPRESSION TYPE: ICD-10-CM

## 2024-11-12 PROCEDURE — 1159F MED LIST DOCD IN RCRD: CPT | Performed by: PSYCHOLOGIST

## 2024-11-12 PROCEDURE — 99211NT NEUROPYSCH TESTING PENDING FINAL BILLING: Performed by: PSYCHOLOGIST

## 2024-11-12 PROCEDURE — 1157F ADVNC CARE PLAN IN RCRD: CPT | Performed by: PSYCHOLOGIST

## 2024-11-12 PROCEDURE — 1123F ACP DISCUSS/DSCN MKR DOCD: CPT | Performed by: PSYCHOLOGIST

## 2024-11-12 NOTE — PROGRESS NOTES
Neuropsychology Evaluation    Name: Hafsa Wilson  : 1940  MRN: [id number]  Referring Provider: Zunilda Daniels MD    Date of Service: 2024      Reason for Visit: Neuropsychological evaluation related to memory difficulty and concern regarding cognitive decline.    Summary/Impressions: In the context of presumed high-average premorbid intellectual functioning, present results demonstrated:  Performance validity: Valid.  Simple auditory attention span: Within normal limits / largely average.  Processing speed: Widely variable, with a number of performances falling in deficient ranges (largely those involving visual processing), as well as some average scores.  Basic visuoconstruction: Largely within normal limits.  Language: Average.  Executive functions: Largely below expectation, with deficits in flexible problem solving, mental set shifting, and practical judgment (pertaining to health/safety knowledge).  Learning/memory: Inefficient encoding of a word list (with significant benefit from repetition), with benefit from structure context (i.e., story information); consistent weakness/difficulty with later information retrieval across memory measures; largely intact recognition performance. This pattern suggests difficulty with the executive aspects of learning and memory processes, as opposed to sandeep memory loss/deficit.  Fine motor speed: Average, with slight R>L discrepancy.  Psychological symptoms: Significant depression (moderate severity) and anxiety (moderate severity) indicated on self-report measures.    DIAGNOSTIC IMPRESSIONS: History (which suggests a degree of cognitive decline that has begun to interfere with the ability to perform IADLs) and present results indicate a dementia (F03.91), with deficits in aspects of processing speed and executive functioning. Etiology is unknown, though the neurocognitive profile is suggestive of frontal-subcortical dysfunction; given Ms. Wilson's  neuroimaging findings, vascular disease may be contributory.     Given relative sparing of memory retention, language, and basic visuospatial abilities, the present cognitive profile is not consistent with a medial-temporal neurodegenerative process.     Comprehensive neurological workup is recommended for optimal clarification of diagnosis/etiology and treatment plan. Additional neuroimaging (e.g., MRI, FDG PET-CT) may be particularly beneficial.    RECOMMENDATIONS & CONSIDERATIONS:    1.   Given the nature of present findings, comprehensive neurological workup, with subsequent monitoring and follow-up care, would be prudent.    Ms. Wilson might benefit from a cholinesterase inhibitor (e.g., donepezil, galantamine, rivastigmine) or N-methyl D-aspartate (NMDA) antagonist (e.g., memantine) to help preserve cognitive functioning, if not medically contraindicated.     2.   Ms. Wilson and her family might benefit from consultation with our multi-disciplinary team at [facility] in [place] ([address] [place] [address], [address]; [phone]), who can help clarify diagnosis/etiology, provide further input on treatment options, apprise Ms. Wilson of any relevant clinical trials, assess patient and family needs, identify resources in the community, and design a comprehensive management plan.    3.   Ms. Wilson lives alone and does not describe difficulty completing daily activities. However, given the present neurocognitive profile and potential functional concerns, she may benefit from increased family involvement, such as assistance with managing medications, finances, and meal preparation. If additional supports appear necessary, particularly if there is further decline, she might benefit from home health/assistance services or consider relocation to a skilled nursing community that offers structure and monitoring for safety (e.g., assisted-living programs), in addition to the opportunity to spend time with  others socially and engagement cognitively and physically stimulating activities.    As part of long-term future planning, Ms. Wilson and her family might wish to discuss her healthcare needs/preferences, as well as issues related to making medical, legal, and financial decisions.      4.   Neuropsychological evaluation does not directly assess driving ability; however, difficulties with aspects of processing speed and executive functioning documented on this evaluation may impact Ms. Wilson's ability to drive safely. If she strongly prefers to continue driving, and there have been no driving incidents to date, a formal (simulation and/or on-the-road)  evaluation is recommended to confirm that deficits identified here are not interfering with her driving safety. For more information, contact the Rehabilitation Department at [facility] ([phone]). The Association for  Rehabilitation may have additional options for  rehabilitation specialists (this does not constitute endorsement of their resources), which can be accessed by visiting https://[employer].net, calling [phone], or emailing info@[employer].net.    5.   In general, information is more likely to be reliably remembered when efforts are made to explicitly enhance initial learning, such as by rehearsing, paraphrasing, and restating information; and by identifying links between to-be learned information and prior knowledge.     Structural and organizational reinforcements are often helpful for individuals with attention/memory complaints and executive difficulties. As such, Ms. Wilson may benefit from the following strategies:    Ms. Wilson appears to have some difficulty initiating and applying her own organizational strategies and is likely to struggle with tasks that are unstructured. As such, she may benefit from additional structural and organizational reinforcements in her day-to-day life. Some general tips include:              a.  Following a routine and consistent daily schedule.             b.  Using a single planner, calendar, or electronic organizer to plan and manage appointment dates, activities, and tasks.            c.  Working on one task at a time until it is completed.            d.  Keeping a single list of tasks, listed by priority, and marking them when complete.            e.  Placing reminders in places where they are easily noticed (e.g., a note on door).            f.   Setting reminder alarms for important tasks and events (e.g., medications, appointments) using a mobile device (e.g., smartphone, iPad), computer calendar, or  (e.g., [employer]).             g.  Writing down important information (e.g., tasks, conversational details, list items) immediately, to strengthen encoding and for later reference. This will also help to free up cognitive resources to focus on the task at hand.            h.  Associating new information with older, previously stored information (e.g., familiar categories, anecdotes, references, reminders).            i.  Using recognition cue cards in everyday memory situations that come up with frequency, such as lists of routine household tasks, errands, or grocery items to prompt recall.            j.   Having specific locations for frequently used items (e.g., keys, wallet, phone).    5.   Stress, anxiety, and/or depression may intermittently reduce cognitive effectiveness, typically through interfering with normal attention and efficiency of information processing. These difficulties can compromise other aspects of cognition, such as remembering conversations; attending to multiple tasks at once; and organizing thoughts and speech. Also, these factors can hinder the ability to adapt to, or develop compensatory strategies for, any subtle cognitive changes that may be present. The severity of Ms. Steve's described depression, anxiety, and stress reaction suggests  that psychological symptoms are not optimally managed at present; targeting these factors would be prudent.     Ongoing psychiatric medication management with Dr. Daniels is recommended.    Leading a physically, socially, and cognitively active lifestyle is important for healthy brain aging. Within the bounds of safety, good judgment, and medical advice, this includes engaging in regular exercise (e.g., walking, swimming, yoga); Ms. Wilson would likely benefit from involvement in supervised physical activity (e.g., physical therapy, exercise activities within her longterm community, going to the gym with a friend/family member). As much as possible, given health/safety considerations, Ms. Wilson is also encouraged to maximize her engagement in social activities, as a means of promoting cognitive stimulation and emotional benefits that will optimize quality of life. She is likely to benefit from exploring/pursue new hobbies that allow for a balance of enjoyment, independence, and safety.    6.   Restorative sleep (7-9 hours/night recommended for adults) is critical for daytime alertness/safety, cognitive effectiveness, and mood, as well as physical, neurological, and mental health more broadly. Ms. Wilson described experiencing daytime fatigue despite adequate sleep; as such, she might benefit from consultation with a sleep specialist and/or formal sleep study to investigate and address potential variables that may be interfering with sleep.    In general, the following behavioral strategies and environmental modifications can help optimize sleep duration and quality:            a.  Set a fixed bedtime and waking time every day.            b.  Avoid napping during the day.             c.  Avoid alcohol, caffeine and heavy, spicy, or sugary foods 4-6 hours before bedtime.             d.  Exercise regularly, but not right before going to bed.            e.  Block out all distractions by turning off - or even  removing from the room - electronic devices such as TV, computer, phone, video player, audio player, leesa device, etc.            f.  Use comfortable bedding, set a comfortable temperature, and keep the room well ventilated.            g.  Do not use the bed as an office, workroom, or recreation room.             h.  Establish a pre-sleep ritual, such as a warm bath or a few minutes of reading.            i.  If prone to anxiety, relaxation techniques such as yoga and deep breathing can be helpful.     7.   There was a degree of hearing difficulty noted during the evaluation, particularly with regard to certain sounds/pitches. Given that hearing impairment can undermine the acquisition of information and has been associated with cognitive decline, Ms. Wilson is encouraged to ensure regular use of her hearing aids, with routine audiological monitoring and follow-up care.     Audiological services are available at [facility] ([phone]) and [facility] ([phone]). In the community, [facility] ([phone]) comes highly recommended, with locations in [place] ([address]) and [place] ([address]).    8.   Ms. Wilson is encouraged to remain in close contact with medical professionals regarding her heart health, cerebrovascular risk factors (e.g., hypertension), hypothyroidism, and other conditions may affect current and future brain function.    The following activities and interventions are recommended for optimizing brain health and preserving cognitive function:            a.  “heart-healthy”/cardiovascular diet;            b.  good stress management (e.g., relaxation techniques);            c.  management of any vascular risks (e.g., hypertension, cholesterol);            d.  30-60 minutes of daily aerobic exercise (e.g., walking, swimming);            e.  social engagement (e.g., getting together with other people);             f.  adequate  sleep; and             g.  cognitively stimulating activities (e.g., classes to learn new things, challenging puzzles).      9.   Ms. Wilson and her family might benefit from the following resources:             a.  Consultation with a  who specializes in cognitive decline in older adulthood and can assist with counseling, educational resources, patient support groups, caregiver support groups, and preparation for future changes. If they establish care at [facility] ([address] [place] [address] [address] in [place]; [phone]), Beth Wachter or Anne Casey could help with a referral.            b.  Lesly Castañeda at [facility] runs a caregiver training program that uses empirically based techniques to prepare and equip caregivers for the demands of that role when caring for someone with dementia.             c.  Helpful resources for addressing the day-to-day challenges of cognitive dysfunction are offered at the Alzheimer's Association (now the Alzheimer's Disease and Related Disorders Association) website (www.alz.org) or by calling their 24-hour Helpline (1-662.600.4250).             d.  The [employer] [place] website also has helpful information: http://www.caregiver.org/caregiver/jsp/home.jsp            e.  Creating Moments of Tali by Melissa Kebede provides a number of simple, helpful hints for caregivers.            f.  The 36-Hour Day: A Family Guide to Caring for Persons with Alzheimer's Disease, Related Dementing Illnesses, and Memory Loss in Later Life (3rd Edition) by Guerline Hamm M.A. and Tino Rae M.D.    10.   Repeat neuropsychological evaluation in 1-2 years, or as clinically indicated, using present data as baseline, may be helpful in determining if there is further cognitive decline and to characterize Ms. Wilson's needs at that time.      Thank you for the opportunity to participate in Ms. Wilson's evaluation and care. If I can be of  further assistance, please do not hesitate to contact me at [phone].      -- EXTENDED REPORT --      History of Presenting Illness: Ms. Wilson is an 84 year-old, right-handed,  female, referred by her psychiatrist for neuropsychological evaluation due to concerns of memory difficulty and cognitive decline.     RELEVANT LABS/EXAMS (with radiologist impressions of neuroimaging):       - Head CT w/o IV contrast from 11/16/2023 (related to dizziness): Hypoattenuation of periventricular and subcortical white matter suggestive of chronic small vessel ischemic disease. Mild diffuse parenchymal volume loss. No intra/extra-axial fluid collection, mass effect, or midline shift. Gray/white matter junction preserved. Basal cisterns patent.      - Head CT w/o IV contrast from 1/29/2024:  Atrophy resulting in prominence of the ventricles and sulci. Areas of decreased attenuation within the white matter which are nonspecific but are commonly associated with small vessel ischemic disease. There is no mass effect or midline shift. No acute intracranial hemorrhage is identified. No extra-axial fluid collections are seen. No intraparenchymal mass lesions are identified. Bone windows demonstrate no evidence of an acute calvarial fracture.    CLINICAL INTERVIEW: Ms. Benton reported increased memory difficulty since 2020; she is more prone to forgetting details (e.g., score of a game she watched 1-2 hours earlier) and reliant on writing down information to aid later recall. In response to more direct prompts, she also acknowledged increased difficulty keeping track of her belongings, diminished ability to multi-task, and occasional word-finding difficulty. There is some forgetfulness of conversational details, though nothing notable. She denied significant difficulty with language comprehension but indicated that hearing loss may interfere with conversations.    Ms. Wilson's daughter (Annabelle) reported slowly  progressive cognitive decline over the past 5 years, including increased difficulty with new procedural learning (e.g., operating a new phone) and general comprehension (particularly with increased complexity). There is occasional confusion (e.g., tendency to mix up dates, details involving timeline of past events). Cognitive status is generally better in the morning. There have not been any dramatic personality changes; daughter expressed some concern regarding the patient's potential vulnerability to deception, though she has not fallen victim to any scams.     Ms. Wilson lives alone in a 55+ shelter community (since 2020); regarding instrumental activities of daily living (IADLs):      - Medication management: Independent (using a pill organizer); no reported difficulty or problems with adherence (may miss ~1 dose/month), though there is no oversight. She does not monitor her blood pressure regularly.    - Finances / bill paying: Daughter assumed responsibility for most bills (e.g., utilities, credit card) when patient moved in 2020, primarily due to tremor (interfering with writing checks) and lack of familiarity with online banking.    - Scheduling / appointments: Independent (with increased reliance on a calendar)   - Driving: Limited primarily to daytime; no recent accidents/near misses, serious traffic violations, navigation difficulty, or instances of getting lost. Daughter denied significant concerns regarding driving safety.   - Cooking / meal preparation: Independent, though Ms. Wilson described diminished motivation in this domain. No reported functional problems/difficulty or safety incidents/concerns (e.g., forgetting to turn off the stove/oven).   - Shopping: Independent; no reported problems/difficulty.   - Household tasks: Otherwise independent; no reported problems/difficulty.   - Ms. Wilson is fully independent with more basic ADLs (e.g., bathing, toileting, dressing, hygiene, eating),  "without any problems or functional changes/concerns.    Ms. Wilson's daughter (Serenity) reportedly has healthcare power of , though relevant documentation could not be found in her record.    Relevant Medical Status & History:    - Sleep: “Like a rock.” Typically in bed around 10PM, without significant difficulty falling asleep. Ms. Wilson wakes once/night (nocturia) without notable difficulty returning to sleep. She generally feels well-rested in the morning (usually rises around 6AM) but becomes fatigued during the day and tends to nap/doze. She denied awareness of snoring or any abnormal sleep behaviors. She has not undergone formal sleep study.   - Appetite: “Okay”; weight has been relatively stable on a normal diet.    - Physical activity/exercise: \"Nothing.\"    - Alcohol use: Rare (~3x/year); no reported history of particularly heavy or problematic alcohol use.   - Tobacco: Cigarette smoker since age 25; estimated 4-7 cigarettes/day recently.    - Recreational drugs: Denied past and present use.   - Caffeine: 1 cup of coffee in the AM; attempting to transition to decaffeinated coffee (and tea).   - H2O: \"Very little\" (<8 oz/day).    Ms. Wilson reported diminished balance, with tendency to trip but no significant recent falls. She denied significant numbness/tingling in lower extremities but acknowledged occasional lightheadedness/dizziness. She reported occasional action tremor, as well as increased urinary urgency/incontinence within the last 5 years.    No known history of concussion/head injury, seizure, or stroke/TIA.    Patient Active Problem List   Diagnosis    Adenomatous polyp of colon    Anemia in stage 3a chronic kidney disease (Multi)    Bilateral pseudophakia    Bilateral sensorineural hearing loss    Depression with anxiety    Early dry stage nonexudative age-related macular degeneration of both eyes    Epiretinal membrane (ERM) of right eye    Hearing loss of right ear    Hypermetropia " of left eye    Hypothyroidism    Meibomian gland dysfunction (MGD) of upper and lower eyelid of left eye    Meibomian gland dysfunction (MGD) of upper and lower eyelid of right eye    Mild cognitive impairment    Myopia of both eyes    Myopic retinopathy of both eyes    Nevus    Nicotine dependence, unspecified, uncomplicated    Oral mucosal lesion    Osteopenia    Weight loss    Primary hypertension    Pseudophakia of left eye    Pseudophakia of right eye    Neck pain, chronic    Decreased ROM of neck    Anxiety    Dizziness and giddiness    Impacted cerumen    Mild cognitive disorder    Myopia    Olecranon bursitis of left elbow    Onychomycosis    Palpitations    Smokes tobacco daily    Traumatic injury     Past Medical History:   Diagnosis Date    Abdominal distension (gaseous) 03/12/2013    Abdominal distention    Contusion of left lower leg, initial encounter 07/30/2015    Contusion of leg, left    Displacement of intraocular lens, initial encounter 10/17/2022    Dislocated intraocular lens    Encounter for examination of eyes and vision without abnormal findings     Encounter for ophthalmic examination and evaluation    Encounter for gynecological examination (general) (routine) without abnormal findings     Encounter for gynecological examination without abnormal finding    Encounter for screening for cardiovascular disorders     Encounter for screening for cardiovascular disorders    Epiretinal membrane (ERM) of right eye     Impacted cerumen, right ear 04/16/2021    Impacted cerumen of right ear    Ingrowing nail 04/26/2021    Ingrown toenail    Macular RPE mottling     Nail dystrophy 07/25/2018    Dystrophic nail    Other conditions influencing health status     X-Ray    Other conditions influencing health status 11/08/2013    Foot pain, unspecified laterality    Other specified anxiety disorders     Depression with anxiety    Pain in left toe(s) 04/26/2021    Chronic pain of toe of left foot    Pain in  unspecified knee     Joint pain, knee    Palpitations     Palpitations    Personal history of other diseases of the circulatory system     History of hypertension    Personal history of other diseases of the musculoskeletal system and connective tissue 09/16/2021    History of neck pain    Personal history of other diseases of the musculoskeletal system and connective tissue     History of osteopenia    Personal history of other diseases of the respiratory system 09/21/2015    History of acute bronchitis    Personal history of other endocrine, nutritional and metabolic disease     History of hypothyroidism    Personal history of other infectious and parasitic diseases     History of herpes zoster    Personal history of other infectious and parasitic diseases 04/26/2021    History of onychomycosis    Personal history of other specified conditions     History of epistaxis    Personal history of other specified conditions     History of fatigue    Personal history of other specified conditions 11/01/2018    History of breast lump    Rash and other nonspecific skin eruption 09/04/2018    Rash    Supraventricular tachycardia (CMS-HCC)     Supraventricular tachycardia    Syncope and collapse 03/08/2017    Syncope and collapse    Toxic effect of venom of bees, accidental (unintentional), initial encounter 07/21/2018    Bee sting    Unspecified abnormal findings in urine     Malodorous urine     Current Outpatient Medications:     aspirin 81 mg EC tablet, Take 1 tablet (81 mg) by mouth once daily    calcium carbonate-vitamin D3 600 mg-5 mcg (200 unit) tablet, Take 2 tablets by mouth once daily    desoximetasone (Topicort) 0.25 % ointment, Apply 1 Application topically twice a day    diclofenac sodium (Voltaren) 1 % gel, Apply 4.5 inches (4 g) topically 4 times a day    DULoxetine (Cymbalta) 20 mg DR capsule, Take 1 capsule by mouth every day. Do not crush, chew, or split    levothyroxine (Synthroid, Levoxyl) 100 mcg tablet,  "Take 1 tablet (100 mcg) by mouth once daily    lisinopril 10 mg tablet, Take 1 tablet (10 mg) by mouth once daily    mirtazapine (Remeron) 45 mg tablet, Take 1 tablet (45 mg) by mouth once daily at bedtime.    Psychiatric Status & History: Ms. Wilson described her recent mood as \"steady\" and \"boring.\" She acknowledged a degree of depression, typically ranging from 4-8 on a scale from 0 (none) to 10 (most severe). Her daughter attributes some of the depression to loneliness/isolation; given the patient's longstanding introversion, she has had particular difficulty adjusting to the move (from her home of 52 years) in 2020. When asked about recent worry/anxiety, the patient stated, \"My kids think I do.\" She acknowledged intermittent passive suicidal ideation (i.e., thoughts of death) but denied any associated plan, intent, or history of suicide attempt/self-harm. She denied any history of homicidal ideation or auditory/visual hallucinations. She was otherwise largely unsure of her psychiatric history.   - Treatment history: Followed by Dr. Daniels for psychiatric management since 12/7/2023 (previously managed by Dr. Weiss). No reported history of psychotherapy/counseling.   - Typical daily activities: Watching television; housekeeping tasks.   - Social support: Limited; Ms. Wilson described reluctance to reach out to people for help (not wanting to be a \"burden\"). She sees her daughter (Serenity; lives in [place]) ~1x/week; social interaction is otherwise minimal (described herself as \"not good at making friends\"). Her other children live out of state; Annabelle lives in [place] (sees patient >2x/year), and Jarrod lives in [place].    Developmental/Educational/Psychosocial History:      - Early development: Reportedly normal; no known birth complications or developmental delays.   - Academic history: No history of attention difficulties, learning or behavioral problems, special services/academic accommodations, or " repeated/skipped grades. Grades were generally As/Bs.   - Educational attainment: Bachelor's degree in Education from [employer] (later awarded master's degree status through work).   - Employment history: ; retired in 1998.   - Social history: , with 3 adult children (ages 52, 54, and 59) and 4 grandchildren.    Relevant Family History: Stroke (mother); heart attack (father; paternal relatives).   No known family history of dementia, cognitive impairment/decline, or psychiatric conditions.     Procedures/Tests:  Review of available records; Adult Neuropsychology History Form; Clinical interview with Ms. Wilson; Separate conversation with her daughter (Annabelle; with patient's permission) for collateral history;  Paez Anxiety Inventory (AGUILAR)  Paez Depression Inventory (BDI-2)  [place] Naming Test-Second Edition (BNT-2)  Dot Counting Test (DCT)  Executive Clock Drawing Task (CLOX)  Finger Tapping Test  Francisco Verbal Learning Test-Revised (HVLT-R) - Form 1   Independent Living Scales (ILS) Health & Safety  Modified [place] Card Sorting Test (MWCST)  Stroop Color and Word Test (Stroop)  Trail Making Test (TMT)  Verbal fluency tests (FAS and Animals)  Wechsler Adult Intelligence Scale-Fourth Edition (WAIS-IV) Digit Span and Coding subtests  Wechsler Memory Scale-Fourth Edition (WMS-IV) Logical Memory and Visual Reproduction   Wechsler Test of Adult Reading (WTAR)    The purpose of this evaluation was reviewed, as were procedural details, issues related to confidentiality, and options for receiving feedback regarding results. All questions were answered; Ms. Wilson verbalized understanding and agreed to proceed with this evaluation.    All testing was administered by a psychometrist; results were interpreted in the context of the appropriate normative data.     Behavioral Observations/Neurobehavioral Status Exam: Ms. Wilson arrived for the appointment on time and accompanied  by her daughter (Annabelle). She was casually dressed and appropriately groomed. Gait was somewhat slow and unsteady. There were no apparent involuntary motor movements during the evaluation. She was alert and fully oriented to self, situation, place, and time. She wore glasses; vision was reportedly adequate for testing. She wore bilateral hearing aids; a degree of hearing difficulty was still present, which required increased conversational volume and occasional repetition but did not appear to interfere significantly with formal testing. Receptive language appeared largely intact on informal observation, though there was some difficulty grasping more complex concepts (e.g., during testing, some instructions needed to be repeated/clarified in multiple ways to ensure comprehension). Conversational speech was somewhat dysarthric (subtle) and very soft, but generally fluent, with occasional word-finding difficulty. Ms. Wilson was largely open and responsive to questions, with seemingly adequate recall of autobiographical and clinical information, aside from difficulty with timeline details. Organizing her thoughts appeared effortful at times. Expressed thoughts were generally logical and goal-directed, though she occasionally lost her train of thought. She demonstrated limited insight into cognitive and functional difficulties. Affect was full, congruent with mood/context/thought content, and primarily dysphoric/anxious, with occasional appropriate brightening; she was easily overwhelmed and occasionally became discouraged/upset during testing. Overall, she was cooperative in completing evaluation procedures and appeared to give her best effort on tasks.     Results/Data:       Descriptor T-Score Standard Score Z-Score Scaled Score %ile Rank   Very Superior > 70 > 130 > 2.00  > 16 > 98   Superior 63-69 120-129 1.34 to 1.99 14-15 91-97   High Average 57-62 110-119 0.67 to 1.33 12-13 75-90   Average 43-56  -0.66  to 0.66 8-11 25-74   Low Average 37-42 80-89 -1.33 to -0.67 6-7 9-24   Borderline 33-36 75-79 -1.67 to -1.34 5 5-8   Mildly Deficient 30-32 70-74 -2.00 to -1.68 4 2-4   Moderately Deficient 25-29 62-69  -2.53 to -2.01 3 1   Severely Deficient <24 <61 <-2.54 1-2 <1      Performance Validity: Results of stand-alone and embedded measures of performance validity were largely within valid ranges.     Premorbid Functioning: Based on a measure of sight-reading ability, premorbid intellectual functioning was estimated to be in the high-average range (estimated FSIQ = 113), commensurate with Ms. Wilson's educational and vocational history.    Attention & Working Memory: Span of auditory attention/working memory, measured by repetition of digits, was average overall, with low-average digits forward, and average digits backward and in sequence (i.e., ascending numerical order).    Processing Speed & Executive Abilities: Efficiency in matching and reproducing symbols associated with numbers was average. Speeded word reading and color naming were severely deficient; color naming of incongruent color words (e.g., blue ink spelling the word “red”) was mildly deficient, with 2 errors.    Performance on a timed task that requires simultaneous visual scanning, number sequencing, and fine-motor coordination was moderately deficient, with 0 errors. When the task became more complex, requiring alternating attention to sequencing numbers and letters, performance was moderately deficient, with 1 error. A comparison of scores on the two tasks suggested relative difficulty with shifting of mental set.    On a concept-formation and problem-solving task that involves matching cards by shared fundamental attributes and using hypothesis testing (i.e., trial and error) to inform responses, overall performance was moderately deficient. Ms. Wilson had difficulty effectively utilizing performance feedback to guide/adapt her approach,  ultimately  completing only 2 of 6 target solutions.     On a measure of practical knowledge and judgment pertaining to aspects of health and safety, which required Ms. Wilson to articulate potential solutions to hypothetical emergencies and medical problems, performance was in the moderately deficient range.    Learning & Memory: Learning and recall of a 12-item word list over three trials was moderately deficient, with significant benefit from repetition (trial scores: 1, 4, 6). After a 20-minute delay, recall was low average, with 4 list words recalled. Ms. Wilson accurately recognized 11 of the words, with 2 false-positive errors, indicating low-average recognition discriminability.    Immediate recall of logically organized semantic information in the form of two stories was average, with evidence of perseveration/source-memory confusion, as details from the first story intruded into recall of the second. After a 20-minute delay, recall was low average, though a semantic reminder cue was needed to prompt recall for one of the stories. Delayed recognition of story detail was in the average range.     Immediate recall of complex geometric designs was average; 20-minute delayed recall was moderately deficient (0 design elements recalled). Delayed recognition of the designs was in the average range.     Language Functioning: Naming of common objects was average (53/60), with significant benefit from phonemic cues (6/7). Letter fluency (i.e., rapid generation of words beginning with a given letter over three one-minute trials) and category fluency (i.e., rapid generation of words in a specific category) were average (FAS = 38; animals = 17).    Visuospatial Functioning: Ability to copy a series of two-dimensional geometric designs of varying complexity was average. Spontaneous generation of a clock drawing was average, though the minute hand was incorrectly placed (i.e., did not indicate the stated time). Copy of a clock  drawing was average.     Motor Functioning: Fine motor speed, as measured by finger tapping, was average bilaterally.    Emotional Functioning: On face-valid self-report inventories assessing mood and emotional status, Ms. Wilson's responses suggested moderate depression and moderate anxiety.        Time-based service: 14670 (50 minutes); 52818 (60 minutes); 12259 (139 minutes); 71806 (30 minutes); 53527 (208 minutes)   none

## 2024-11-18 DIAGNOSIS — F09 MILD COGNITIVE DISORDER: ICD-10-CM

## 2024-11-18 DIAGNOSIS — F41.8 DEPRESSION WITH ANXIETY: ICD-10-CM

## 2024-11-18 RX ORDER — DULOXETIN HYDROCHLORIDE 20 MG/1
20 CAPSULE, DELAYED RELEASE ORAL DAILY
Qty: 30 CAPSULE | Refills: 0 | Status: SHIPPED | OUTPATIENT
Start: 2024-11-18

## 2024-12-02 ENCOUNTER — EVALUATION (OUTPATIENT)
Dept: PHYSICAL THERAPY | Facility: CLINIC | Age: 84
End: 2024-12-02
Payer: MEDICARE

## 2024-12-02 DIAGNOSIS — S82.091A: ICD-10-CM

## 2024-12-02 PROCEDURE — 97161 PT EVAL LOW COMPLEX 20 MIN: CPT | Mod: GP

## 2024-12-02 PROCEDURE — 97110 THERAPEUTIC EXERCISES: CPT | Mod: GP

## 2024-12-02 ASSESSMENT — ENCOUNTER SYMPTOMS
OCCASIONAL FEELINGS OF UNSTEADINESS: 0
DEPRESSION: 0
LOSS OF SENSATION IN FEET: 0

## 2024-12-02 NOTE — PROGRESS NOTES
Physical Therapy Evaluation    Patient Name: Hafsa Wilson  MRN: [id number]  Time Calculation  Start Time: 1047  Stop Time: 1130  Time Calculation (min): 43 min  PT Evaluation Time Entry  PT Evaluation (Low) Time Entry: 10  PT Therapeutic Procedures Time Entry  Therapeutic Exercise Time Entry: 33                   Current Problem  1. Closed patellar sleeve fracture, right, initial encounter  Referral to Physical Therapy        Insurance    Insurance reviewed   Visit number: 1  Approved number of visits: BMN   [employer] BMN PT OT COPAY 0 DED 0,COVERAGE 96 OOP 1500([phone]) NO AUTH REQ,REF   Subjective   General:  Pt reports to the clinic with complaint of R knee pain that started after suffering a fall over a stool when her house had no electricity on September 12, 2024. She reports falling directly onto R knee and originally not doing anything about it, however, pain did not get any better. XR on 10/31/24 showed a Stable appearing and satisfactory healing subacute minimally displaced comminuted patellar fracture, showing signs of interval healing with  increased callus formation. Denies any other falls, besides the September one. She was given a small t scope brace that Dr. Mohr advised her to wear.     Occupation: retired     Lifestyle: exercises at home     Living Environment: alone, 1 LEYDA with 1 HR, no stairs to bed/bath         Pain:  Description of Symptoms:    Pain: ache  Location: [Specify the area of pain or discomfort]  Severity: current/best-0/10  Worst-4/10   Intensity Variation: none   Duration of Pain: intermittent   Aggravating Factors: getting up from a chair or sitting for long periods   Relieving Factors: heat  Associated Symptoms: no numbness or tingling      Reviewed medical screening form with pt and medical screening assessed    Imaging:   XR on 10/31/24 showed a Stable appearing and satisfactory healing subacute minimally displaced comminuted patellar fracture, showing signs of interval  healing with  increased callus formation    Objective   Knee Musculoskeletal Exam  Gait    Antalgic: right    Inspection    Right      Right knee inspection is normal.      Palpation    Right      Right knee palpation is unremarkable.      Range of Motion    Right      Right knee range of motion is normal and full.      Left      Left knee range of motion is normal and full.      Range of motion additional comments: Patellar mobility (R): WNL all directions     Strength    Right      Extension: 4+/5.       Flexion: 4-/5.     Left      Extension: 4/5.       Flexion: 3/5.        Outcome Measures:  Other Measures  Lower Extremity Funtional Score (LEFS): 55     Treatment:   Measures for eval    EDUCATION/HEP:  Access Code: [email or web address]  URL: [email or web address]  Date: 12/02/2024  Prepared by: Anne Leal    Exercises  - Supine Active Straight Leg Raise  - 1 x daily - 4 x weekly - 2 sets - 10 reps  - Supine Bridge  - 1 x daily - 4 x weekly - 2 sets - 10 reps  - Clamshell  - 1 x daily - 4 x weekly - 2 sets - 10 reps  - Supine Quad Set  - 1 x daily - 4 x weekly - 2 sets - 10 reps  - Supine Knee Extension Strengthening  - 1 x daily - 4 x weekly - 2 sets - 10 reps  - Sit to Stand  - 1 x daily - 4 x weekly - 2 sets - 10 reps    Assessment:  Pt reports to the clinic with complaint of R knee pain that started after suffering a fall over a stool when her house had no electricity on September 12, 2024. She reports falling directly onto R knee and originally not doing anything about it, however, pain did not get any better. XR on 10/31/24 showed a Stable appearing and satisfactory healing subacute minimally displaced comminuted patellar fracture, showing signs of interval healing with  increased callus formation. Denies any other falls, besides the September one. She was given a small t scope brace that Dr. Mohr advised her to wear. Pt's R knee ROM, patellar mobility, and strength all WFL. She really has  no complaints at today's visit and just wanted some clear exercises to work on at home. Pt was educated on HEP and to let PT know if anything changes, but feels confident to maintain on her own.     Clinical Presentation: Stable    Level of Complexity: Low     Goals:  Pt will be independent with advanced HEP   Pt will increase Andres LE strength 4+-5/5 including good eccentric quad to perform all functional mobility  Pt will ambulate community distances independent over varying surfaces/inclines without increased R knee pain                    Pt will improve LEFS score by at least 9 points (MCID) to indicate significant improvement in functional abilities.      Plan  Will continue HEP independently.     Skilled therapeutic intervention to address the above mentioned physical and functional impairments and limitations including, but not limited to: patient education, therapeutic exercise, therapeutic activity, manual therapy, body mechanics training, dry needling, blood flow restriction training, instrumented soft tissue mobilization, manual soft tissue mobilization, gait retraining, biofeedback, cryotherapy, electrical stimulation, home program development, hot pack, taping, neuromuscular re-education, self-care/home management, and vasopneumatic compression.

## 2024-12-09 ENCOUNTER — TELEMEDICINE (OUTPATIENT)
Facility: CLINIC | Age: 84
End: 2024-12-09
Payer: MEDICARE

## 2024-12-09 DIAGNOSIS — F09 COGNITIVE AND NEUROBEHAVIORAL DYSFUNCTION: ICD-10-CM

## 2024-12-09 DIAGNOSIS — F03.A0 MILD DEMENTIA, UNSPECIFIED DEMENTIA TYPE, UNSPECIFIED WHETHER BEHAVIORAL, PSYCHOTIC, OR MOOD DISTURBANCE OR ANXIETY: ICD-10-CM

## 2024-12-09 DIAGNOSIS — F32.A DEPRESSION, UNSPECIFIED DEPRESSION TYPE: ICD-10-CM

## 2024-12-09 DIAGNOSIS — R41.3 MEMORY DIFFICULTY: Primary | ICD-10-CM

## 2024-12-09 DIAGNOSIS — F41.9 ANXIETY: ICD-10-CM

## 2024-12-09 PROCEDURE — 1123F ACP DISCUSS/DSCN MKR DOCD: CPT | Performed by: PSYCHOLOGIST

## 2024-12-09 PROCEDURE — 96133 NRPSYC TST EVAL PHYS/QHP EA: CPT | Mod: AH,95 | Performed by: PSYCHOLOGIST

## 2024-12-09 PROCEDURE — 96132 NRPSYC TST EVAL PHYS/QHP 1ST: CPT | Mod: AH,95 | Performed by: PSYCHOLOGIST

## 2024-12-09 PROCEDURE — 96116 NUBHVL XM PHYS/QHP 1ST HR: CPT | Performed by: PSYCHOLOGIST

## 2024-12-09 PROCEDURE — 96133 NRPSYC TST EVAL PHYS/QHP EA: CPT | Performed by: PSYCHOLOGIST

## 2024-12-09 PROCEDURE — 96132 NRPSYC TST EVAL PHYS/QHP 1ST: CPT | Performed by: PSYCHOLOGIST

## 2024-12-09 PROCEDURE — 96139 PSYCL/NRPSYC TST TECH EA: CPT | Performed by: PSYCHOLOGIST

## 2024-12-09 PROCEDURE — 96139 PSYCL/NRPSYC TST TECH EA: CPT | Mod: AH,95 | Performed by: PSYCHOLOGIST

## 2024-12-09 PROCEDURE — 96138 PSYCL/NRPSYC TECH 1ST: CPT | Mod: AH,95 | Performed by: PSYCHOLOGIST

## 2024-12-09 PROCEDURE — 96138 PSYCL/NRPSYC TECH 1ST: CPT | Performed by: PSYCHOLOGIST

## 2024-12-09 PROCEDURE — 1157F ADVNC CARE PLAN IN RCRD: CPT | Performed by: PSYCHOLOGIST

## 2024-12-09 PROCEDURE — 96116 NUBHVL XM PHYS/QHP 1ST HR: CPT | Mod: AH,95 | Performed by: PSYCHOLOGIST

## 2024-12-09 NOTE — PROGRESS NOTES
Neuropsychology Note    Name: Hafsa Wilson  : 1940  MRN: [id number]      Date of Service: 2024     Reason For Visit: Ms. Wilson underwent neuropsychological evaluation on 2024, due to memory difficulty and concern regarding cognitive decline. The full report can be found in the note for that visit.     She returned today for feedback regarding evaluation results.    Summary: Ms. Wilson was seen today to discuss findings from her neuropsychological evaluation on 2024. She was accompanied by her daughter (Serenity); her other children (Annabelle and Jarrod) joined via telephone.    Results were reviewed and explained, including variable (sometimes slowed) processing speed and deficits in aspects of executive functioning.    There was a thorough discussion of clinical impressions, including diagnosis of dementia, as it seems cognitive impairment has begun to interfere with the patient's ability to carry out instrumental daily activities.     Psychoeducation was provided regarding the role of executive abilities in day-to-day functioning, including learning/memory processes and emotion regulation; the importance of heart/vascular health in brain health; and the impact of stress, anxiety, depression, and fatigue/insufficient restorative sleep on cognitive effectiveness. Recommendations were discussed, and questions were addressed.    Ms. Wilson appeared quite overwhelmed but was appropriately engaged in the appointment; affect was anxious. She and her family verbalized understanding of findings, impressions, and recommendations.          Time-based service:    - Evaluation: 22939 (50 minutes); 22235 (60 minutes); 51577 (139 minutes); 69481 (30 minutes); 94182 (208 minutes)   - Feedback via telehealth: 97892 (78 minutes)

## 2024-12-19 ENCOUNTER — TELEPHONE (OUTPATIENT)
Dept: PRIMARY CARE | Facility: CLINIC | Age: 84
End: 2024-12-19
Payer: MEDICARE

## 2024-12-19 NOTE — TELEPHONE ENCOUNTER
Pt called wanting to speak with Dr. Weiss  reference to pt has been to several other appt and needs to discuss with doctor and get recommendations.    Please call pt at  [phone]

## 2024-12-19 NOTE — TELEPHONE ENCOUNTER
Discussed at length  Has dementia  Rec make an appointment to see neurology as recommended by Dr Mojica

## 2024-12-30 DIAGNOSIS — F09 MILD COGNITIVE DISORDER: ICD-10-CM

## 2024-12-30 DIAGNOSIS — F41.8 DEPRESSION WITH ANXIETY: ICD-10-CM

## 2024-12-30 RX ORDER — DULOXETIN HYDROCHLORIDE 20 MG/1
20 CAPSULE, DELAYED RELEASE ORAL DAILY
Qty: 30 CAPSULE | Refills: 0 | Status: SHIPPED | OUTPATIENT
Start: 2024-12-30

## 2025-01-15 ENCOUNTER — APPOINTMENT (OUTPATIENT)
Dept: PRIMARY CARE | Facility: CLINIC | Age: 85
End: 2025-01-15
Payer: MEDICARE

## 2025-01-15 VITALS
DIASTOLIC BLOOD PRESSURE: 71 MMHG | HEART RATE: 85 BPM | BODY MASS INDEX: 15.78 KG/M2 | TEMPERATURE: 97 F | SYSTOLIC BLOOD PRESSURE: 135 MMHG | WEIGHT: 98.2 LBS | OXYGEN SATURATION: 99 % | HEIGHT: 66 IN

## 2025-01-15 DIAGNOSIS — F03.A3 MILD DEMENTIA WITH MOOD DISTURBANCE, UNSPECIFIED DEMENTIA TYPE (MULTI): Primary | ICD-10-CM

## 2025-01-15 DIAGNOSIS — I10 PRIMARY HYPERTENSION: ICD-10-CM

## 2025-01-15 DIAGNOSIS — Z00.00 ROUTINE GENERAL MEDICAL EXAMINATION AT HEALTH CARE FACILITY: ICD-10-CM

## 2025-01-15 PROCEDURE — 99397 PER PM REEVAL EST PAT 65+ YR: CPT | Performed by: INTERNAL MEDICINE

## 2025-01-15 PROCEDURE — 3075F SYST BP GE 130 - 139MM HG: CPT | Performed by: INTERNAL MEDICINE

## 2025-01-15 PROCEDURE — 1157F ADVNC CARE PLAN IN RCRD: CPT | Performed by: INTERNAL MEDICINE

## 2025-01-15 PROCEDURE — 99214 OFFICE O/P EST MOD 30 MIN: CPT | Performed by: INTERNAL MEDICINE

## 2025-01-15 PROCEDURE — G0439 PPPS, SUBSEQ VISIT: HCPCS | Performed by: INTERNAL MEDICINE

## 2025-01-15 PROCEDURE — 3078F DIAST BP <80 MM HG: CPT | Performed by: INTERNAL MEDICINE

## 2025-01-15 PROCEDURE — 1123F ACP DISCUSS/DSCN MKR DOCD: CPT | Performed by: INTERNAL MEDICINE

## 2025-01-15 PROCEDURE — 1170F FXNL STATUS ASSESSED: CPT | Performed by: INTERNAL MEDICINE

## 2025-01-15 RX ORDER — DONEPEZIL HYDROCHLORIDE 5 MG/1
5 TABLET, FILM COATED ORAL NIGHTLY
Qty: 90 TABLET | Refills: 1 | Status: SHIPPED | OUTPATIENT
Start: 2025-01-15 | End: 2025-07-14

## 2025-01-15 ASSESSMENT — ENCOUNTER SYMPTOMS
ACTIVITY CHANGE: 0
GASTROINTESTINAL NEGATIVE: 1
CARDIOVASCULAR NEGATIVE: 1
UNEXPECTED WEIGHT CHANGE: 0
DEPRESSION: 1
LOSS OF SENSATION IN FEET: 0
RESPIRATORY NEGATIVE: 1
APPETITE CHANGE: 0
OCCASIONAL FEELINGS OF UNSTEADINESS: 0

## 2025-01-15 ASSESSMENT — PATIENT HEALTH QUESTIONNAIRE - PHQ9
2. FEELING DOWN, DEPRESSED OR HOPELESS: NOT AT ALL
1. LITTLE INTEREST OR PLEASURE IN DOING THINGS: NOT AT ALL
SUM OF ALL RESPONSES TO PHQ9 QUESTIONS 1 AND 2: 0

## 2025-01-15 ASSESSMENT — ACTIVITIES OF DAILY LIVING (ADL)
BATHING: INDEPENDENT
GROCERY_SHOPPING: INDEPENDENT
DOING_HOUSEWORK: INDEPENDENT
DRESSING: INDEPENDENT
MANAGING_FINANCES: INDEPENDENT
TAKING_MEDICATION: INDEPENDENT

## 2025-01-15 NOTE — PROGRESS NOTES
"Subjective   Reason for Visit: Hafsa Wilson is an 84 y.o. female here for a Medicare Wellness visit.       HPI  85 yo F presents for MWV. Last seen in clinic in Sept. Generally feels well. Since last visit had neuropsychiatric testing done concerning for dementia, Alzheimer's subtype. Patient has been feeling sad since being told. Was also being seen by ortho for right patellar fx. Cleared to wear brace as needed. Has been using when she leaves the house and taking off at home. Uses walker in the house. Has some residual pain, but feels stable when walking. States appetite is good and she is eating three times a day.       Patient Care Team:  Alie Weiss MD as PCP - General  Alie Weiss MD as PCP - [employer]age PCP     Review of Systems   Constitutional:  Negative for activity change, appetite change and unexpected weight change.   Respiratory: Negative.     Cardiovascular: Negative.    Gastrointestinal: Negative.    All other systems reviewed and are negative.        Objective   Vitals:  /71   Pulse 85   Temp 36.1 °C (97 °F)   Ht 1.676 m (5' 6\")   Wt (!) 44.5 kg (98 lb 3.2 oz)   SpO2 99%   BMI 15.85 kg/m²       Physical Exam  Constitutional: NAD, exhibiting some issues with memory, thin and frail appearing  HEENT: EOMI, no scleral icterus, MMM  CV: RRR, no m/r/g  Pulm: CTAB, no increased WOB  GI: Soft, NT, ND  Extremities: no notable edema  Skin: warm  Neuro: grossly alert and oriented  Psych: Mood and affect appropriate to situation   Assessment & Plan  Routine general medical examination at health care facility    Orders:    1 Year Follow Up In [facility] - PCP - Wellness Exam       85 yo F presents for MWV.     #Dementia, Alzeimer's subtype  -start donepezil 5mg  -follow-up in 3 months  -has an appt with neurology scheduled    #Depression  -encouraged patient to make an appt with psychiatry, has not been seen for several months    #HTN  -well " controlled  -continue lisinopril    Health Maintenance  -lab work ordered  -patient declining mammogram at this time

## 2025-01-20 ENCOUNTER — APPOINTMENT (OUTPATIENT)
Dept: BEHAVIORAL HEALTH | Facility: CLINIC | Age: 85
End: 2025-01-20
Payer: MEDICARE

## 2025-01-29 ENCOUNTER — TELEPHONE (OUTPATIENT)
Dept: PRIMARY CARE | Facility: CLINIC | Age: 85
End: 2025-01-29
Payer: MEDICARE

## 2025-01-29 NOTE — TELEPHONE ENCOUNTER
Pt was put on new medication for memory donepezil 5mg - she is having dreams that upset her. Pt stated she feels terrible, she's not dressed, she does not remember if she had breakfast (pt is getting worse). Pharmacy did state that hallucinations were a side effect

## 2025-01-29 NOTE — TELEPHONE ENCOUNTER
Dw pt's daughter Serenity  A couple of days ago she saw mom and was fine - today she is extremely confused  Dishevelled   Daughter made her toast and boost - now sleeping  Can stop Aricept   Should monitor closely for 24 hours and see how she does  If still confused needs to go to ER

## 2025-01-31 ENCOUNTER — TELEPHONE (OUTPATIENT)
Dept: PRIMARY CARE | Facility: CLINIC | Age: 85
End: 2025-01-31
Payer: MEDICARE

## 2025-01-31 NOTE — TELEPHONE ENCOUNTER
Discussed with daughter  Mom seems a little clearer  Was not taking her meds - told kids I stopped them (which I had not)  Seeing psychiatry on Monday    Encouraged them to reconsider her living situation - lives alone  Very isolated

## 2025-02-03 ENCOUNTER — APPOINTMENT (OUTPATIENT)
Dept: BEHAVIORAL HEALTH | Facility: CLINIC | Age: 85
End: 2025-02-03
Payer: MEDICARE

## 2025-02-03 VITALS — SYSTOLIC BLOOD PRESSURE: 159 MMHG | DIASTOLIC BLOOD PRESSURE: 89 MMHG | TEMPERATURE: 97.6 F | HEART RATE: 78 BPM

## 2025-02-03 DIAGNOSIS — F41.8 DEPRESSION WITH ANXIETY: ICD-10-CM

## 2025-02-03 DIAGNOSIS — F09 MILD COGNITIVE DISORDER: ICD-10-CM

## 2025-02-03 DIAGNOSIS — F03.90 MAJOR NEUROCOGNITIVE DISORDER (MULTI): ICD-10-CM

## 2025-02-03 PROCEDURE — 1123F ACP DISCUSS/DSCN MKR DOCD: CPT | Performed by: PSYCHIATRY & NEUROLOGY

## 2025-02-03 PROCEDURE — 1157F ADVNC CARE PLAN IN RCRD: CPT | Performed by: PSYCHIATRY & NEUROLOGY

## 2025-02-03 PROCEDURE — 3077F SYST BP >= 140 MM HG: CPT | Performed by: PSYCHIATRY & NEUROLOGY

## 2025-02-03 PROCEDURE — 3079F DIAST BP 80-89 MM HG: CPT | Performed by: PSYCHIATRY & NEUROLOGY

## 2025-02-03 PROCEDURE — 99214 OFFICE O/P EST MOD 30 MIN: CPT | Performed by: PSYCHIATRY & NEUROLOGY

## 2025-02-03 RX ORDER — PNV NO.95/FERROUS FUM/FOLIC AC 28MG-0.8MG
100 TABLET ORAL DAILY
Qty: 30 TABLET | Refills: 11 | Status: SHIPPED | OUTPATIENT
Start: 2025-02-03 | End: 2026-02-03

## 2025-02-03 RX ORDER — MIRTAZAPINE 45 MG/1
45 TABLET, FILM COATED ORAL NIGHTLY
Qty: 30 TABLET | Refills: 3 | Status: SHIPPED | OUTPATIENT
Start: 2025-02-03

## 2025-02-03 ASSESSMENT — ENCOUNTER SYMPTOMS
HALLUCINATIONS: 0
FATIGUE: 0
APPETITE CHANGE: 1
TREMORS: 0
CONFUSION: 0
SEIZURES: 0
HYPERACTIVE: 0
DECREASED CONCENTRATION: 1
DYSPHORIC MOOD: 0
AGITATION: 0
NERVOUS/ANXIOUS: 0
SLEEP DISTURBANCE: 0

## 2025-02-03 NOTE — PROGRESS NOTES
"Subjective   Patient ID: Hafsa Wilson is a 84 y.o. female who presents for follow up  HPI  Had neuropsychological testing done by Dr Mojica, Dementia is most likely not consistent with medial temporal neurodegenerative disease but the neurocognitive profile is suggestive of frontal-subcortical dysfunction and vascular disease is contributory   Last giuliano, we continued Remeron 45 and added Cymbalta 20 mg po daily.   Today, she feels depressed.   Memory is not good. \" I do not know, it just went\".   She does not know her meds, unable to verify \" you already prescribed one, but I am not taking it but I take Mirtazapine that I have been taking for years\".   CT from a year ago:  No radiographic evidence of an acute fracture. Mild  anterior wedging of T1 which is most likely remote/chronic.  Multilevel degenerative changes.    B12: 225, RPR negative.   TSH, lipids CBC and COMP are ordered from PCP. Not done yet.     MoCA in    MoCA today   Visuospatial: 3/5  Namin/3  Attention: 16  Language: 2/3  Abstraction: 1/2  Delayed recall: 0/5  Orientation: 4/6        Review of Systems   Constitutional:  Positive for appetite change. Negative for fatigue.   Musculoskeletal:  Negative for gait problem.   Neurological:  Negative for tremors and seizures.   Psychiatric/Behavioral:  Positive for decreased concentration. Negative for agitation, behavioral problems, confusion, dysphoric mood, hallucinations, self-injury, sleep disturbance and suicidal ideas. The patient is not nervous/anxious and is not hyperactive.        Objective   Physical Exam  Psychiatric:         Attention and Perception: She is inattentive.         Mood and Affect: Mood normal.         Speech: Speech normal.         Behavior: Behavior is cooperative.         Thought Content: Thought content normal.         Cognition and Memory: Cognition is impaired.       Vitals:    25 0958   BP: 159/89   Pulse: 78   Temp: 36.4 °C (97.6 °F)    "   No visits with results within 6 Month(s) from this visit.   Latest known visit with results is:   Lab on 02/20/2024   Component Date Value    Vitamin B12 02/20/2024 225     Syphilis Total Ab 02/20/2024 Nonreactive      Lab Results   Component Value Date     11/16/2023     02/22/2023     04/18/2022     02/13/2021    K 3.8 11/16/2023    K 4.6 02/22/2023    K 4.0 04/18/2022    K 4.2 02/13/2021    CO2 28 11/16/2023    CO2 32 02/22/2023    CO2 33 (H) 04/18/2022    CO2 31 02/13/2021    BUN 10 11/16/2023    BUN 17 02/22/2023    BUN 16 04/18/2022    BUN 21 02/13/2021    CREATININE 0.91 11/16/2023    CREATININE 0.98 02/22/2023    CREATININE 1.16 (H) 04/18/2022    CREATININE 1.15 (H) 02/13/2021    GLUCOSE 90 11/16/2023    GLUCOSE 82 02/22/2023    GLUCOSE 86 04/18/2022    GLUCOSE 75 02/13/2021    CALCIUM 9.4 11/16/2023    CALCIUM 10.4 (H) 02/22/2023    CALCIUM 10.1 04/18/2022    CALCIUM 10.0 02/13/2021     Lab Results   Component Value Date    WBC 8.1 11/16/2023    HGB 13.7 11/16/2023    HCT 42.1 11/16/2023    MCV 96 11/16/2023     11/16/2023     Lab Results   Component Value Date    CHOL 163 02/22/2023    TRIG 84 02/22/2023    HDL 66.7 02/22/2023     Assessment/Plan   Problem List Items Addressed This Visit             ICD-10-CM    Depression with anxiety F41.8    Major neurocognitive disorder (Multi) F03.90        I encouraged her to do the lab work done by PCP  Follow up with PCP for elevated blood pressure   I do not suggest she drives   Will discontinue Cymbalta since she is not taking it. Continue Remeron   Will contact the daughter and try to arranged a follow up appointment to discuss the diagnosis and get more collaterals   Will need to arrange for a follow up in a month to go over the findings

## 2025-02-04 ENCOUNTER — APPOINTMENT (OUTPATIENT)
Dept: OPHTHALMOLOGY | Facility: CLINIC | Age: 85
End: 2025-02-04
Payer: MEDICARE

## 2025-02-04 DIAGNOSIS — H52.4 PRESBYOPIA: ICD-10-CM

## 2025-02-04 DIAGNOSIS — H52.223 REGULAR ASTIGMATISM OF BOTH EYES: Primary | ICD-10-CM

## 2025-02-04 PROCEDURE — 92015 DETERMINE REFRACTIVE STATE: CPT | Performed by: OPTOMETRIST

## 2025-02-04 ASSESSMENT — REFRACTION_MANIFEST
OD_AXIS: 092
OS_SPHERE: +0.25
OD_ADD: +3.00
OD_CYLINDER: -2.00
OD_SPHERE: PLANO
OS_CYLINDER: -0.25
OS_AXIS: 081
OS_ADD: +3.00
OD_CYLINDER: -1.75
METHOD_AUTOREFRACTION: 1
OS_AXIS: 067
OD_AXIS: 092
OS_CYLINDER: -1.25
OS_SPHERE: +0.25
OD_SPHERE: PLANO

## 2025-02-04 ASSESSMENT — ENCOUNTER SYMPTOMS
CARDIOVASCULAR NEGATIVE: 0
RESPIRATORY NEGATIVE: 0
MUSCULOSKELETAL NEGATIVE: 0
ALLERGIC/IMMUNOLOGIC NEGATIVE: 0
HEMATOLOGIC/LYMPHATIC NEGATIVE: 0
EYES NEGATIVE: 1
CONSTITUTIONAL NEGATIVE: 0
GASTROINTESTINAL NEGATIVE: 0
NEUROLOGICAL NEGATIVE: 0
PSYCHIATRIC NEGATIVE: 0
ENDOCRINE NEGATIVE: 0

## 2025-02-04 ASSESSMENT — VISUAL ACUITY
OS_CC: 20/30-1
CORRECTION_TYPE: GLASSES
OD_CC: J1+
METHOD: SNELLEN - LINEAR
OS_CC: J1+
OD_CC: 20/25

## 2025-02-04 ASSESSMENT — REFRACTION_WEARINGRX
OD_SPHERE: -0.50
OS_SPHERE: +0.25
OS_CYLINDER: -0.50
OS_ADD: +3.00
OD_CYLINDER: -1.00
OS_AXIS: 073
OD_ADD: +3.00
OD_AXIS: 097

## 2025-02-04 NOTE — PROGRESS NOTES
Assessment/Plan   Diagnoses and all orders for this visit:  Regular astigmatism of both eyes  Presbyopia  New spec rx released today per patient request. Monitor 1 year or sooner prn. Refraction only billed today. Pt consents to receiving glasses Rx today. Patient's/guardian's signature obtained to acknowledge and confirm that a paper copy of glasses Rx was given to patient in compliance with [facility] Eyeglass Rule. Electronic copy of Rx will also be available via [employer].   [facility] OU 20/20.

## 2025-03-04 ENCOUNTER — APPOINTMENT (OUTPATIENT)
Dept: BEHAVIORAL HEALTH | Facility: CLINIC | Age: 85
End: 2025-03-04
Payer: MEDICARE

## 2025-03-04 VITALS
SYSTOLIC BLOOD PRESSURE: 157 MMHG | WEIGHT: 92.3 LBS | HEART RATE: 82 BPM | DIASTOLIC BLOOD PRESSURE: 79 MMHG | BODY MASS INDEX: 14.9 KG/M2

## 2025-03-04 DIAGNOSIS — G30.9 MAJOR NEUROCOGNITIVE DISORDER, DUE TO ALZHEIMER'S DISEASE, WITHOUT BEHAVIORAL DISTURBANCE, MILD (MULTI): ICD-10-CM

## 2025-03-04 DIAGNOSIS — F02.A0 MAJOR NEUROCOGNITIVE DISORDER, DUE TO ALZHEIMER'S DISEASE, WITHOUT BEHAVIORAL DISTURBANCE, MILD (MULTI): ICD-10-CM

## 2025-03-04 PROCEDURE — 1123F ACP DISCUSS/DSCN MKR DOCD: CPT | Performed by: PSYCHIATRY & NEUROLOGY

## 2025-03-04 PROCEDURE — 3077F SYST BP >= 140 MM HG: CPT | Performed by: PSYCHIATRY & NEUROLOGY

## 2025-03-04 PROCEDURE — 1160F RVW MEDS BY RX/DR IN RCRD: CPT | Performed by: PSYCHIATRY & NEUROLOGY

## 2025-03-04 PROCEDURE — 1159F MED LIST DOCD IN RCRD: CPT | Performed by: PSYCHIATRY & NEUROLOGY

## 2025-03-04 PROCEDURE — 1157F ADVNC CARE PLAN IN RCRD: CPT | Performed by: PSYCHIATRY & NEUROLOGY

## 2025-03-04 PROCEDURE — 99215 OFFICE O/P EST HI 40 MIN: CPT | Performed by: PSYCHIATRY & NEUROLOGY

## 2025-03-04 PROCEDURE — 3078F DIAST BP <80 MM HG: CPT | Performed by: PSYCHIATRY & NEUROLOGY

## 2025-03-04 RX ORDER — RIVASTIGMINE TARTRATE 1.5 MG/1
1.5 CAPSULE ORAL 2 TIMES DAILY
Qty: 60 CAPSULE | Refills: 1 | Status: SHIPPED | OUTPATIENT
Start: 2025-03-04 | End: 2025-05-03

## 2025-03-04 ASSESSMENT — ENCOUNTER SYMPTOMS
DYSPHORIC MOOD: 1
DECREASED CONCENTRATION: 1
TREMORS: 1
SEIZURES: 0
NERVOUS/ANXIOUS: 1
APPETITE CHANGE: 1
AGITATION: 0
SLEEP DISTURBANCE: 1

## 2025-03-04 NOTE — PROGRESS NOTES
Subjective   Patient ID: Hafsa Wilson is a 84 y.o. female who presents for follow up  HPI  We discontinued Cymbalta last giuliano and continued Remeron   Daughter who is her medical POA was present   Night henry improved after she stopped taking Aricept   Daughter was able to provide more details about her memory decline. She stated   Her main issue with timelines, For instance, a year ago she went to a  but thought she went to a different person`s .   She has hard time with technology as well   Daughter would not suggest a long drive, but comfortable with short distances.   The other daughter manages finances   Review of Systems   Constitutional:  Positive for appetite change.   Musculoskeletal:  Negative for gait problem.   Neurological:  Positive for tremors. Negative for seizures.   Psychiatric/Behavioral:  Positive for decreased concentration, dysphoric mood and sleep disturbance. Negative for agitation and behavioral problems. The patient is nervous/anxious.        Objective   Physical Exam  Psychiatric:         Attention and Perception: Attention normal.         Mood and Affect: Mood is anxious.         Speech: Speech normal.         Behavior: Behavior is cooperative.         Thought Content: Thought content normal.         Cognition and Memory: Cognition normal.         Judgment: Judgment normal.       There were no vitals filed for this visit.   No visits with results within 6 Month(s) from this visit.   Latest known visit with results is:   Lab on 2024   Component Date Value    Vitamin B12 2024 225     Syphilis Total Ab 2024 Nonreactive      Lab Results   Component Value Date     2023     2023     2022     2021    K 3.8 2023    K 4.6 2023    K 4.0 2022    K 4.2 2021    CO2 28 2023    CO2 32 2023    CO2 33 (H) 2022    CO2 31 2021    BUN 10 2023    BUN 17 2023    BUN 16  04/18/2022    BUN 21 02/13/2021    CREATININE 0.91 11/16/2023    CREATININE 0.98 02/22/2023    CREATININE 1.16 (H) 04/18/2022    CREATININE 1.15 (H) 02/13/2021    GLUCOSE 90 11/16/2023    GLUCOSE 82 02/22/2023    GLUCOSE 86 04/18/2022    GLUCOSE 75 02/13/2021    CALCIUM 9.4 11/16/2023    CALCIUM 10.4 (H) 02/22/2023    CALCIUM 10.1 04/18/2022    CALCIUM 10.0 02/13/2021     Lab Results   Component Value Date    WBC 8.1 11/16/2023    HGB 13.7 11/16/2023    HCT 42.1 11/16/2023    MCV 96 11/16/2023     11/16/2023     Lab Results   Component Value Date    CHOL 163 02/22/2023    TRIG 84 02/22/2023    HDL 66.7 02/22/2023     Current Outpatient Medications on File Prior to Visit   Medication Sig Dispense Refill    aspirin 81 mg EC tablet Take 1 tablet (81 mg) by mouth once daily.      calcium carbonate-vitamin D3 600 mg-5 mcg (200 unit) tablet Take 2 tablets by mouth once daily.      cyanocobalamin (Vitamin B-12) 100 mcg tablet Take 1 tablet (100 mcg) by mouth once daily. 30 tablet 11    levothyroxine (Synthroid, Levoxyl) 100 mcg tablet Take 1 tablet (100 mcg) by mouth once daily. 90 tablet 1    lisinopril 10 mg tablet Take 1 tablet (10 mg) by mouth once daily. 100 tablet 3    mirtazapine (Remeron) 45 mg tablet Take 1 tablet (45 mg) by mouth once daily at bedtime. 30 tablet 3    desoximetasone (Topicort) 0.25 % ointment Apply 1 Application topically twice a day. APPLY AND GENTLY MASSAGE INTO AFFECTED AREA(S) TWICE DAILY. (Patient not taking: Reported on 3/4/2025)      diclofenac sodium (Voltaren) 1 % gel Apply 4.5 inches (4 g) topically 4 times a day. (Patient not taking: Reported on 3/4/2025) 100 g 1    donepezil (Aricept) 5 mg tablet Take 1 tablet (5 mg) by mouth once daily at bedtime. (Patient not taking: Reported on 3/4/2025) 90 tablet 1     No current facility-administered medications on file prior to visit.      Patient Active Problem List   Diagnosis    Adenomatous polyp of colon    Anemia in stage 3a chronic  kidney disease (Multi)    Bilateral pseudophakia    Bilateral sensorineural hearing loss    Depression with anxiety    Early dry stage nonexudative age-related macular degeneration of both eyes    Epiretinal membrane (ERM) of right eye    Hearing loss of right ear    Hypermetropia of left eye    Hypothyroidism    Meibomian gland dysfunction (MGD) of upper and lower eyelid of left eye    Meibomian gland dysfunction (MGD) of upper and lower eyelid of right eye    Mild cognitive impairment    Myopia of both eyes    Myopic retinopathy of both eyes    Nevus    Nicotine dependence, unspecified, uncomplicated    Oral mucosal lesion    Osteopenia    Weight loss    Primary hypertension    Pseudophakia of left eye    Pseudophakia of right eye    Neck pain, chronic    Decreased ROM of neck    Anxiety    Dizziness and giddiness    Impacted cerumen    Mild cognitive disorder    Myopia    Olecranon bursitis of left elbow    Onychomycosis    Palpitations    Smokes tobacco daily    Traumatic injury    Major neurocognitive disorder (Multi)      Assessment/Plan   Problem List Items Addressed This Visit             ICD-10-CM    Major neurocognitive disorder, due to Alzheimer's disease, without behavioral disturbance, mild (Multi) G30.9, F02.A0        Follow up with PCP for weight loss and increased blood pressure   Discussed the diagnosis, discussed non pharmacological intervention, encouraged physical, mental and social activities   Discussed a trial of Rivastigmine, risks, benefits and alternatives since Aricept caused nightmares   Repeat B12  Discussed amyloid tx, family will think about it   Driving eval, no driving until cleared by  driving eval   May start Buspar next giuliano if Rivastigmine is tolerated   Follow up in 1 month or sooner if needed  47 minutes spent in this encounter

## 2025-03-04 NOTE — PATIENT INSTRUCTIONS
Follow up with PCP for weight loss and increased blood pressure   Discussed the diagnosis, discussed non pharmacological intervention, encouraged physical, mental and social activities   Discussed a trial of Rivastigmine, risks, benefits and alternatives since Aricept caused nightmares   Repeat B12  Discussed amyloid tx, family will think about it   Driving eval, no driving until cleared by  driving eval   May start Buspar next giuliano if Rivastigmine is tolerated   Follow up in 1 month or sooner if needed

## 2025-03-20 DIAGNOSIS — E03.9 ACQUIRED HYPOTHYROIDISM: ICD-10-CM

## 2025-03-20 RX ORDER — LEVOTHYROXINE SODIUM 100 UG/1
100 TABLET ORAL DAILY
Qty: 90 TABLET | Refills: 3 | Status: SHIPPED | OUTPATIENT
Start: 2025-03-20

## 2025-03-26 ENCOUNTER — TELEPHONE (OUTPATIENT)
Dept: BEHAVIORAL HEALTH | Facility: CLINIC | Age: 85
End: 2025-03-26
Payer: MEDICARE

## 2025-03-26 NOTE — PROGRESS NOTES
Pt called for a refill on Remeron 45 mg. Nurse called patient back after speaking with Pharmacy, pt has a refill ready for pickup.

## 2025-04-02 ENCOUNTER — APPOINTMENT (OUTPATIENT)
Dept: BEHAVIORAL HEALTH | Facility: CLINIC | Age: 85
End: 2025-04-02
Payer: MEDICARE

## 2025-04-03 ENCOUNTER — OFFICE VISIT (OUTPATIENT)
Dept: BEHAVIORAL HEALTH | Facility: CLINIC | Age: 85
End: 2025-04-03
Payer: MEDICARE

## 2025-04-03 VITALS
WEIGHT: 93.7 LBS | HEART RATE: 66 BPM | DIASTOLIC BLOOD PRESSURE: 73 MMHG | RESPIRATION RATE: 18 BRPM | SYSTOLIC BLOOD PRESSURE: 164 MMHG | HEIGHT: 67 IN | BODY MASS INDEX: 14.71 KG/M2 | TEMPERATURE: 97.6 F

## 2025-04-03 DIAGNOSIS — G30.9 MAJOR NEUROCOGNITIVE DISORDER, DUE TO ALZHEIMER'S DISEASE, WITHOUT BEHAVIORAL DISTURBANCE, MILD (MULTI): ICD-10-CM

## 2025-04-03 DIAGNOSIS — F41.8 DEPRESSION WITH ANXIETY: ICD-10-CM

## 2025-04-03 DIAGNOSIS — F09 MILD COGNITIVE DISORDER: ICD-10-CM

## 2025-04-03 DIAGNOSIS — F02.A0 MAJOR NEUROCOGNITIVE DISORDER, DUE TO ALZHEIMER'S DISEASE, WITHOUT BEHAVIORAL DISTURBANCE, MILD (MULTI): ICD-10-CM

## 2025-04-03 PROCEDURE — 99214 OFFICE O/P EST MOD 30 MIN: CPT | Performed by: PSYCHIATRY & NEUROLOGY

## 2025-04-03 PROCEDURE — 1126F AMNT PAIN NOTED NONE PRSNT: CPT | Performed by: PSYCHIATRY & NEUROLOGY

## 2025-04-03 PROCEDURE — 1160F RVW MEDS BY RX/DR IN RCRD: CPT | Performed by: PSYCHIATRY & NEUROLOGY

## 2025-04-03 PROCEDURE — 3077F SYST BP >= 140 MM HG: CPT | Performed by: PSYCHIATRY & NEUROLOGY

## 2025-04-03 PROCEDURE — 1159F MED LIST DOCD IN RCRD: CPT | Performed by: PSYCHIATRY & NEUROLOGY

## 2025-04-03 PROCEDURE — 1123F ACP DISCUSS/DSCN MKR DOCD: CPT | Performed by: PSYCHIATRY & NEUROLOGY

## 2025-04-03 PROCEDURE — 1157F ADVNC CARE PLAN IN RCRD: CPT | Performed by: PSYCHIATRY & NEUROLOGY

## 2025-04-03 PROCEDURE — 3078F DIAST BP <80 MM HG: CPT | Performed by: PSYCHIATRY & NEUROLOGY

## 2025-04-03 RX ORDER — MIRTAZAPINE 45 MG/1
45 TABLET, FILM COATED ORAL NIGHTLY
Qty: 30 TABLET | Refills: 3 | Status: SHIPPED | OUTPATIENT
Start: 2025-04-03

## 2025-04-03 RX ORDER — RIVASTIGMINE TARTRATE 1.5 MG/1
1.5 CAPSULE ORAL 2 TIMES DAILY
Qty: 60 CAPSULE | Refills: 3 | Status: SHIPPED | OUTPATIENT
Start: 2025-04-03 | End: 2025-08-01

## 2025-04-03 ASSESSMENT — ENCOUNTER SYMPTOMS
SLEEP DISTURBANCE: 0
DYSPHORIC MOOD: 0
DECREASED CONCENTRATION: 1
SEIZURES: 0
DIARRHEA: 0
TREMORS: 1
NERVOUS/ANXIOUS: 0
NAUSEA: 0
HYPERACTIVE: 0
HALLUCINATIONS: 0
APPETITE CHANGE: 1
VOMITING: 0

## 2025-04-03 ASSESSMENT — PAIN SCALES - GENERAL: PAINLEVEL_OUTOF10: 0-NO PAIN

## 2025-04-03 NOTE — PROGRESS NOTES
Subjective   Patient ID: Hafsa Wilson is a 85 y.o. female who presents for follow up  HPI  Daughter who is her POA was present  Last giuliano, we discontinued Aricept for nightmares and started Rivastigmine   Per daughter, she seems clear and happier. Less fog   Had a birthday, went to a movie  Goes to [employer].   No nightmares, sleep is good. No daily sadness, no thoughts of suicide.     Current Outpatient Medications on File Prior to Visit   Medication Sig Dispense Refill    aspirin 81 mg EC tablet Take 1 tablet (81 mg) by mouth once daily.      calcium carbonate-vitamin D3 600 mg-5 mcg (200 unit) tablet Take 2 tablets by mouth once daily.      cyanocobalamin (Vitamin B-12) 100 mcg tablet Take 1 tablet (100 mcg) by mouth once daily. 30 tablet 11    desoximetasone (Topicort) 0.25 % ointment Apply 1 Application topically twice a day. APPLY AND GENTLY MASSAGE INTO AFFECTED AREA(S) TWICE DAILY. (Patient not taking: Reported on 3/4/2025)      diclofenac sodium (Voltaren) 1 % gel Apply 4.5 inches (4 g) topically 4 times a day. (Patient not taking: Reported on 3/4/2025) 100 g 1    levothyroxine (Synthroid, Levoxyl) 100 mcg tablet Take 1 tablet (100 mcg) by mouth once daily. 90 tablet 3    lisinopril 10 mg tablet Take 1 tablet (10 mg) by mouth once daily. 100 tablet 3    mirtazapine (Remeron) 45 mg tablet Take 1 tablet (45 mg) by mouth once daily at bedtime. 30 tablet 3    rivastigmine (Exelon) 1.5 mg capsule Take 1 capsule (1.5 mg) by mouth 2 times a day. 60 capsule 1     No current facility-administered medications on file prior to visit.      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 macular degeneration of both eyes    Epiretinal membrane (ERM) of right eye    Hearing loss of right ear    Hypermetropia of  left eye    Hypothyroidism    Meibomian gland dysfunction (MGD) of upper and lower eyelid of left eye    Meibomian gland dysfunction (MGD) of upper and lower eyelid of right eye    Mild cognitive impairment    Myopia of both eyes    Myopic retinopathy of both eyes    Nevus    Nicotine dependence, unspecified, uncomplicated    Oral mucosal lesion    Osteopenia    Weight loss    Primary hypertension    Pseudophakia of left eye    Pseudophakia of right eye    Neck pain, chronic    Decreased ROM of neck    Anxiety    Dizziness and giddiness    Impacted cerumen    Mild cognitive disorder    Myopia    Olecranon bursitis of left elbow    Onychomycosis    Palpitations    Smokes tobacco daily    Traumatic injury    Major neurocognitive disorder (Multi)    Major neurocognitive disorder, due to Alzheimer's disease, without behavioral disturbance, mild (Multi)      Review of Systems   Constitutional:  Positive for appetite change.   Gastrointestinal:  Negative for diarrhea, nausea and vomiting.   Musculoskeletal:  Negative for gait problem.   Neurological:  Positive for tremors. Negative for seizures.        Tremor is chronic per daughter, not noticed during the interview.    Psychiatric/Behavioral:  Positive for decreased concentration. Negative for dysphoric mood, hallucinations, self-injury, sleep disturbance and suicidal ideas. The patient is not nervous/anxious and is not hyperactive.        Objective   Physical Exam  Psychiatric:         Attention and Perception: Attention normal.         Mood and Affect: Mood normal.         Speech: Speech normal.         Behavior: Behavior is cooperative.         Thought Content: Thought content normal.         Cognition and Memory: Cognition is impaired.         Judgment: Judgment normal.       There were no vitals filed for this visit.   No visits with results within 6 Month(s) from this visit.   Latest known visit with results is:   Lab on 02/20/2024   Component Date Value    Vitamin  B12 02/20/2024 225     Syphilis Total Ab 02/20/2024 Nonreactive      Lab Results   Component Value Date     11/16/2023     02/22/2023     04/18/2022     02/13/2021    K 3.8 11/16/2023    K 4.6 02/22/2023    K 4.0 04/18/2022    K 4.2 02/13/2021    CO2 28 11/16/2023    CO2 32 02/22/2023    CO2 33 (H) 04/18/2022    CO2 31 02/13/2021    BUN 10 11/16/2023    BUN 17 02/22/2023    BUN 16 04/18/2022    BUN 21 02/13/2021    CREATININE 0.91 11/16/2023    CREATININE 0.98 02/22/2023    CREATININE 1.16 (H) 04/18/2022    CREATININE 1.15 (H) 02/13/2021    GLUCOSE 90 11/16/2023    GLUCOSE 82 02/22/2023    GLUCOSE 86 04/18/2022    GLUCOSE 75 02/13/2021    CALCIUM 9.4 11/16/2023    CALCIUM 10.4 (H) 02/22/2023    CALCIUM 10.1 04/18/2022    CALCIUM 10.0 02/13/2021     Lab Results   Component Value Date    WBC 8.1 11/16/2023    HGB 13.7 11/16/2023    HCT 42.1 11/16/2023    MCV 96 11/16/2023     11/16/2023     Lab Results   Component Value Date    CHOL 163 02/22/2023    TRIG 84 02/22/2023    HDL 66.7 02/22/2023     Assessment/Plan   Problem List Items Addressed This Visit             ICD-10-CM    Depression with anxiety F41.8    Major neurocognitive disorder, due to Alzheimer's disease, without behavioral disturbance, mild (Multi) G30.9, F02.A0     Continue to follow up with PCP for elevated blood pressure and low appetite    Encouraged to schedule driving eval   Continue Rivastigmine 1.5 mg po twice daily   Continue Remeron 45 mg po at bedtime   [facility] next giuliano   Follow up in 3 months or sooner if needed

## 2025-04-24 ENCOUNTER — APPOINTMENT (OUTPATIENT)
Dept: PRIMARY CARE | Facility: CLINIC | Age: 85
End: 2025-04-24
Payer: MEDICARE

## 2025-04-24 VITALS
TEMPERATURE: 96.7 F | HEART RATE: 66 BPM | WEIGHT: 92.8 LBS | OXYGEN SATURATION: 100 % | SYSTOLIC BLOOD PRESSURE: 143 MMHG | BODY MASS INDEX: 14.56 KG/M2 | HEIGHT: 67 IN | DIASTOLIC BLOOD PRESSURE: 71 MMHG

## 2025-04-24 DIAGNOSIS — E03.9 ACQUIRED HYPOTHYROIDISM: ICD-10-CM

## 2025-04-24 DIAGNOSIS — F02.A0 MAJOR NEUROCOGNITIVE DISORDER, DUE TO ALZHEIMER'S DISEASE, WITHOUT BEHAVIORAL DISTURBANCE, MILD (MULTI): Primary | ICD-10-CM

## 2025-04-24 DIAGNOSIS — I10 PRIMARY HYPERTENSION: ICD-10-CM

## 2025-04-24 DIAGNOSIS — D63.1 ANEMIA IN STAGE 3A CHRONIC KIDNEY DISEASE: ICD-10-CM

## 2025-04-24 DIAGNOSIS — Z00.00 ROUTINE GENERAL MEDICAL EXAMINATION AT A HEALTH CARE FACILITY: ICD-10-CM

## 2025-04-24 DIAGNOSIS — G30.9 MAJOR NEUROCOGNITIVE DISORDER, DUE TO ALZHEIMER'S DISEASE, WITHOUT BEHAVIORAL DISTURBANCE, MILD (MULTI): Primary | ICD-10-CM

## 2025-04-24 DIAGNOSIS — F17.210 CIGARETTE NICOTINE DEPENDENCE WITHOUT COMPLICATION: ICD-10-CM

## 2025-04-24 DIAGNOSIS — N18.31 ANEMIA IN STAGE 3A CHRONIC KIDNEY DISEASE: ICD-10-CM

## 2025-04-24 PROBLEM — H61.20 IMPACTED CERUMEN: Status: RESOLVED | Noted: 2024-01-26 | Resolved: 2025-04-24

## 2025-04-24 PROBLEM — R00.2 PALPITATIONS: Status: RESOLVED | Noted: 2024-01-26 | Resolved: 2025-04-24

## 2025-04-24 PROCEDURE — 1123F ACP DISCUSS/DSCN MKR DOCD: CPT | Performed by: INTERNAL MEDICINE

## 2025-04-24 PROCEDURE — 90677 PCV20 VACCINE IM: CPT | Performed by: INTERNAL MEDICINE

## 2025-04-24 PROCEDURE — G2211 COMPLEX E/M VISIT ADD ON: HCPCS | Performed by: INTERNAL MEDICINE

## 2025-04-24 PROCEDURE — 1159F MED LIST DOCD IN RCRD: CPT | Performed by: INTERNAL MEDICINE

## 2025-04-24 PROCEDURE — 3077F SYST BP >= 140 MM HG: CPT | Performed by: INTERNAL MEDICINE

## 2025-04-24 PROCEDURE — 99214 OFFICE O/P EST MOD 30 MIN: CPT | Performed by: INTERNAL MEDICINE

## 2025-04-24 PROCEDURE — G0009 ADMIN PNEUMOCOCCAL VACCINE: HCPCS | Performed by: INTERNAL MEDICINE

## 2025-04-24 PROCEDURE — 1160F RVW MEDS BY RX/DR IN RCRD: CPT | Performed by: INTERNAL MEDICINE

## 2025-04-24 PROCEDURE — 1157F ADVNC CARE PLAN IN RCRD: CPT | Performed by: INTERNAL MEDICINE

## 2025-04-24 PROCEDURE — 3078F DIAST BP <80 MM HG: CPT | Performed by: INTERNAL MEDICINE

## 2025-04-24 ASSESSMENT — ENCOUNTER SYMPTOMS
PALPITATIONS: 0
COUGH: 0
ACTIVITY CHANGE: 0
SHORTNESS OF BREATH: 0
APPETITE CHANGE: 0

## 2025-04-24 ASSESSMENT — PATIENT HEALTH QUESTIONNAIRE - PHQ9
1. LITTLE INTEREST OR PLEASURE IN DOING THINGS: NOT AT ALL
2. FEELING DOWN, DEPRESSED OR HOPELESS: NOT AT ALL
SUM OF ALL RESPONSES TO PHQ9 QUESTIONS 1 AND 2: 0
1. LITTLE INTEREST OR PLEASURE IN DOING THINGS: NOT AT ALL
SUM OF ALL RESPONSES TO PHQ9 QUESTIONS 1 AND 2: 0
2. FEELING DOWN, DEPRESSED OR HOPELESS: NOT AT ALL

## 2025-04-24 NOTE — ASSESSMENT & PLAN NOTE
Orders:    CBC; Future    Comprehensive Metabolic Panel; Future    TSH with reflex to Free T4 if abnormal; Future

## 2025-04-24 NOTE — PROGRESS NOTES
"Subjective   Patient ID: Hafsa Wilson is a 85 y.o. female who presents for Follow-up.  HPI  Here for follow up with her daughter Serenity  Has been seeing psychiatry - meds were adjusted and she is doing better  Not feeling depressed  Has been doing more  Driving eval not scheduled     Daughter reports she has been making high calorie snacks -   Has boost sometimes      Review of Systems   Constitutional:  Negative for activity change and appetite change.   Respiratory:  Negative for cough and shortness of breath.    Cardiovascular:  Negative for chest pain, palpitations and leg swelling.       Objective   Visit Vitals  /71   Pulse 66   Temp 35.9 °C (96.7 °F)   Ht 1.689 m (5' 6.5\")   Wt (!) 42.1 kg (92 lb 12.8 oz)   SpO2 100%   BMI 14.75 kg/m²   Smoking Status Every Day   BSA 1.41 m²      Patient Health Questionnaire-2 Score: 0 (4/24/2025  1:14 PM)     Physical Exam  Vitals and nursing note reviewed.   Cardiovascular:      Rate and Rhythm: Normal rate and regular rhythm.   Pulmonary:      Effort: Pulmonary effort is normal.      Breath sounds: Normal breath sounds.           Assessment & Plan  Primary hypertension  Stable  Allowing her to run a little high since she gets very dizzy at 130/80       Major neurocognitive disorder, due to Alzheimer's disease, without behavioral disturbance, mild (Multi)  Reminded to go for driving eval; understands the nimprotance of nthis  Orders:    Referral to Occupational Therapy; Future    Acquired hypothyroidism  Labs ordered       Cigarette nicotine dependence without complication  Does not want to quit       Anemia in stage 3a chronic kidney disease  Routine general medical examination at a health care facility      Orders:    CBC; Future    Comprehensive Metabolic Panel; Future    TSH with reflex to Free T4 if abnormal; Future    Follow up with me in 3 months  "

## 2025-04-25 LAB
ALBUMIN SERPL-MCNC: 4.3 G/DL (ref 3.6–5.1)
ALP SERPL-CCNC: 93 U/L (ref 37–153)
ALT SERPL-CCNC: 15 U/L (ref 6–29)
ANION GAP SERPL CALCULATED.4IONS-SCNC: 8 MMOL/L (CALC) (ref 7–17)
AST SERPL-CCNC: 18 U/L (ref 10–35)
BILIRUB SERPL-MCNC: 0.4 MG/DL (ref 0.2–1.2)
BUN SERPL-MCNC: 19 MG/DL (ref 7–25)
CALCIUM SERPL-MCNC: 10.3 MG/DL (ref 8.6–10.4)
CHLORIDE SERPL-SCNC: 98 MMOL/L (ref 98–110)
CO2 SERPL-SCNC: 34 MMOL/L (ref 20–32)
CREAT SERPL-MCNC: 1.1 MG/DL (ref 0.6–0.95)
EGFRCR SERPLBLD CKD-EPI 2021: 49 ML/MIN/1.73M2
ERYTHROCYTE [DISTWIDTH] IN BLOOD BY AUTOMATED COUNT: 12.7 % (ref 11–15)
GLUCOSE SERPL-MCNC: 73 MG/DL (ref 65–99)
HCT VFR BLD AUTO: 42.4 % (ref 35–45)
HGB BLD-MCNC: 13.5 G/DL (ref 11.7–15.5)
MCH RBC QN AUTO: 30.6 PG (ref 27–33)
MCHC RBC AUTO-ENTMCNC: 31.8 G/DL (ref 32–36)
MCV RBC AUTO: 96.1 FL (ref 80–100)
PLATELET # BLD AUTO: 312 THOUSAND/UL (ref 140–400)
PMV BLD REES-ECKER: 12.1 FL (ref 7.5–12.5)
POTASSIUM SERPL-SCNC: 4.7 MMOL/L (ref 3.5–5.3)
PROT SERPL-MCNC: 7 G/DL (ref 6.1–8.1)
RBC # BLD AUTO: 4.41 MILLION/UL (ref 3.8–5.1)
SODIUM SERPL-SCNC: 140 MMOL/L (ref 135–146)
TSH SERPL-ACNC: 1.8 MIU/L (ref 0.4–4.5)
WBC # BLD AUTO: 8.8 THOUSAND/UL (ref 3.8–10.8)

## 2025-05-21 DIAGNOSIS — I10 PRIMARY HYPERTENSION: ICD-10-CM

## 2025-05-21 RX ORDER — LISINOPRIL 10 MG/1
10 TABLET ORAL DAILY
Qty: 90 TABLET | Refills: 0 | Status: SHIPPED | OUTPATIENT
Start: 2025-05-21 | End: 2025-08-19

## 2025-07-01 ENCOUNTER — APPOINTMENT (OUTPATIENT)
Dept: BEHAVIORAL HEALTH | Facility: CLINIC | Age: 85
End: 2025-07-01
Payer: MEDICARE

## 2025-07-03 ENCOUNTER — OFFICE VISIT (OUTPATIENT)
Dept: BEHAVIORAL HEALTH | Facility: CLINIC | Age: 85
End: 2025-07-03
Payer: MEDICARE

## 2025-07-03 VITALS
HEIGHT: 67 IN | SYSTOLIC BLOOD PRESSURE: 166 MMHG | DIASTOLIC BLOOD PRESSURE: 72 MMHG | WEIGHT: 94.8 LBS | BODY MASS INDEX: 14.88 KG/M2 | HEART RATE: 65 BPM | RESPIRATION RATE: 18 BRPM

## 2025-07-03 DIAGNOSIS — F02.A0 MAJOR NEUROCOGNITIVE DISORDER, DUE TO ALZHEIMER'S DISEASE, WITHOUT BEHAVIORAL DISTURBANCE, MILD (MULTI): ICD-10-CM

## 2025-07-03 DIAGNOSIS — F41.8 DEPRESSION WITH ANXIETY: ICD-10-CM

## 2025-07-03 DIAGNOSIS — F09 MILD COGNITIVE DISORDER: ICD-10-CM

## 2025-07-03 DIAGNOSIS — G30.9 MAJOR NEUROCOGNITIVE DISORDER, DUE TO ALZHEIMER'S DISEASE, WITHOUT BEHAVIORAL DISTURBANCE, MILD (MULTI): ICD-10-CM

## 2025-07-03 PROCEDURE — 1160F RVW MEDS BY RX/DR IN RCRD: CPT | Performed by: PSYCHIATRY & NEUROLOGY

## 2025-07-03 PROCEDURE — 1126F AMNT PAIN NOTED NONE PRSNT: CPT | Performed by: PSYCHIATRY & NEUROLOGY

## 2025-07-03 PROCEDURE — 99213 OFFICE O/P EST LOW 20 MIN: CPT | Performed by: PSYCHIATRY & NEUROLOGY

## 2025-07-03 PROCEDURE — 1159F MED LIST DOCD IN RCRD: CPT | Performed by: PSYCHIATRY & NEUROLOGY

## 2025-07-03 PROCEDURE — 3078F DIAST BP <80 MM HG: CPT | Performed by: PSYCHIATRY & NEUROLOGY

## 2025-07-03 PROCEDURE — 3077F SYST BP >= 140 MM HG: CPT | Performed by: PSYCHIATRY & NEUROLOGY

## 2025-07-03 RX ORDER — RIVASTIGMINE TARTRATE 1.5 MG/1
1.5 CAPSULE ORAL 2 TIMES DAILY
Qty: 60 CAPSULE | Refills: 3 | Status: SHIPPED | OUTPATIENT
Start: 2025-07-03 | End: 2025-10-31

## 2025-07-03 RX ORDER — MIRTAZAPINE 45 MG/1
45 TABLET, FILM COATED ORAL NIGHTLY
Qty: 30 TABLET | Refills: 3 | Status: SHIPPED | OUTPATIENT
Start: 2025-07-03

## 2025-07-03 ASSESSMENT — ENCOUNTER SYMPTOMS
APPETITE CHANGE: 0
SEIZURES: 0
DECREASED CONCENTRATION: 1
DIZZINESS: 0
SLEEP DISTURBANCE: 0
DIARRHEA: 0
NAUSEA: 0
HALLUCINATIONS: 0
VOMITING: 0
TREMORS: 1

## 2025-07-03 ASSESSMENT — PAIN SCALES - GENERAL: PAINLEVEL_OUTOF10: 0-NO PAIN

## 2025-07-03 NOTE — PROGRESS NOTES
Subjective   Patient ID: Hafsa Wilson is a 85 y.o. female who presents for follow up  HPI  Daughter who is her POA was present. She helps managing her meds   Doing well overall. Mood is stable, no daily sadness or thoughts of suicide.   Has been active int he garden   Daughter is supportive   Worried about her daughter   She reads, takes care of house chores .   Review of Systems   Constitutional:  Negative for appetite change.   Gastrointestinal:  Negative for diarrhea, nausea and vomiting.   Musculoskeletal:  Negative for gait problem.   Neurological:  Positive for tremors. Negative for dizziness and seizures.   Psychiatric/Behavioral:  Positive for decreased concentration. Negative for hallucinations, sleep disturbance and suicidal ideas.      Medications Ordered Prior to Encounter[1]   Problem List[2]   Objective   Physical Exam  Psychiatric:         Attention and Perception: Attention normal.         Mood and Affect: Mood normal.         Speech: Speech normal.         Behavior: Behavior is cooperative.         Thought Content: Thought content normal.         Cognition and Memory: Cognition is impaired.         Judgment: Judgment normal.       There were no vitals filed for this visit.   Office Visit on 04/24/2025   Component Date Value    WHITE BLOOD CELL COUNT 04/24/2025 8.8     RED BLOOD CELL COUNT 04/24/2025 4.41     HEMOGLOBIN 04/24/2025 13.5     HEMATOCRIT 04/24/2025 42.4     MCV 04/24/2025 96.1     MCH 04/24/2025 30.6     MCHC 04/24/2025 31.8 (L)     RDW 04/24/2025 12.7     PLATELET COUNT 04/24/2025 312     MPV 04/24/2025 12.1     GLUCOSE 04/24/2025 73     UREA NITROGEN (BUN) 04/24/2025 19     CREATININE 04/24/2025 1.10 (H)     EGFR 04/24/2025 49 (L)     SODIUM 04/24/2025 140     POTASSIUM 04/24/2025 4.7     CHLORIDE 04/24/2025 98     CARBON DIOXIDE 04/24/2025 34 (H)     ELECTROLYTE BALANCE 04/24/2025 8     CALCIUM 04/24/2025 10.3     PROTEIN, TOTAL 04/24/2025 7.0     ALBUMIN 04/24/2025 4.3      BILIRUBIN, TOTAL 04/24/2025 0.4     ALKALINE PHOSPHATASE 04/24/2025 93     AST 04/24/2025 18     ALT 04/24/2025 15     TSH W/REFLEX TO FT4 04/24/2025 1.80      Lab Results   Component Value Date     04/24/2025     11/16/2023     02/22/2023     04/18/2022     02/13/2021    K 4.7 04/24/2025    K 3.8 11/16/2023    K 4.6 02/22/2023    K 4.0 04/18/2022    K 4.2 02/13/2021    CO2 34 (H) 04/24/2025    CO2 28 11/16/2023    CO2 32 02/22/2023    CO2 33 (H) 04/18/2022    CO2 31 02/13/2021    BUN 19 04/24/2025    BUN 10 11/16/2023    BUN 17 02/22/2023    BUN 16 04/18/2022    BUN 21 02/13/2021    CREATININE 1.10 (H) 04/24/2025    CREATININE 0.91 11/16/2023    CREATININE 0.98 02/22/2023    CREATININE 1.16 (H) 04/18/2022    CREATININE 1.15 (H) 02/13/2021    GLUCOSE 73 04/24/2025    GLUCOSE 90 11/16/2023    GLUCOSE 82 02/22/2023    GLUCOSE 86 04/18/2022    GLUCOSE 75 02/13/2021    CALCIUM 10.3 04/24/2025    CALCIUM 9.4 11/16/2023    CALCIUM 10.4 (H) 02/22/2023    CALCIUM 10.1 04/18/2022    CALCIUM 10.0 02/13/2021     Lab Results   Component Value Date    WBC 8.8 04/24/2025    HGB 13.5 04/24/2025    HCT 42.4 04/24/2025    MCV 96.1 04/24/2025     04/24/2025     Lab Results   Component Value Date    CHOL 163 02/22/2023    TRIG 84 02/22/2023    HDL 66.7 02/22/2023     Assessment/Plan   Problem List Items Addressed This Visit           ICD-10-CM    Major neurocognitive disorder, due to Alzheimer's disease, without behavioral disturbance, mild (Multi) G30.9, F02.A0        Continue Rivastigmine 1.5 bid and Remeron 45 mg po at bedtime   No driving   Follow up in 3-4 months or sooner if needed        [1]   Current Outpatient Medications on File Prior to Visit   Medication Sig Dispense Refill    aspirin 81 mg EC tablet Take 1 tablet (81 mg) by mouth once daily.      calcium carbonate-vitamin D3 600 mg-5 mcg (200 unit) tablet Take 2 tablets by mouth once daily.      cyanocobalamin (Vitamin B-12) 100 mcg  tablet Take 1 tablet (100 mcg) by mouth once daily. 30 tablet 11    levothyroxine (Synthroid, Levoxyl) 100 mcg tablet Take 1 tablet (100 mcg) by mouth once daily. 90 tablet 3    lisinopril 10 mg tablet Take 1 tablet (10 mg) by mouth once daily. 90 tablet 0    mirtazapine (Remeron) 45 mg tablet Take 1 tablet (45 mg) by mouth once daily at bedtime. 30 tablet 3    rivastigmine (Exelon) 1.5 mg capsule Take 1 capsule (1.5 mg) by mouth 2 times a day. 60 capsule 3    desoximetasone (Topicort) 0.25 % ointment Apply 1 Application topically twice a day. APPLY AND GENTLY MASSAGE INTO AFFECTED AREA(S) TWICE DAILY.      diclofenac sodium (Voltaren) 1 % gel Apply 4.5 inches (4 g) topically 4 times a day. 100 g 1     No current facility-administered medications on file prior to visit.   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 degeneration of both eyes    Epiretinal membrane (ERM) of right eye    Hearing loss of right ear    Hypermetropia of left eye    Hypothyroidism    Meibomian gland dysfunction (MGD) of upper and lower eyelid of left eye    Meibomian gland dysfunction (MGD) of upper and lower eyelid of right eye    Mild cognitive impairment    Myopia of both eyes    Myopic retinopathy of both eyes    Nevus    Nicotine dependence, unspecified, uncomplicated    Oral mucosal lesion    Osteopenia    Weight loss    Primary hypertension    Pseudophakia of left eye    Pseudophakia of right eye    Neck pain, chronic    Decreased ROM of neck    Anxiety    Dizziness and giddiness    Mild cognitive disorder    Myopia    Olecranon bursitis of left elbow    Onychomycosis    Smokes tobacco daily    Traumatic injury    Major neurocognitive disorder (Multi)    Major neurocognitive disorder, due to Alzheimer's disease,  without behavioral disturbance, mild (Multi)

## 2025-07-03 NOTE — PROGRESS NOTES
[facility] Nurse Note    Name: Hafsa Wilson  MRN: [id number]  YOB: 1940    Date: 07/03/25    Reason for Visit:  No chief complaint on file.    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 degeneration of both eyes    Epiretinal membrane (ERM) of right eye    Hearing loss of right ear    Hypermetropia of left eye    Hypothyroidism    Meibomian gland dysfunction (MGD) of upper and lower eyelid of left eye    Meibomian gland dysfunction (MGD) of upper and lower eyelid of right eye    Mild cognitive impairment    Myopia of both eyes    Myopic retinopathy of both eyes    Nevus    Nicotine dependence, unspecified, uncomplicated    Oral mucosal lesion    Osteopenia    Weight loss    Primary hypertension    Pseudophakia of left eye    Pseudophakia of right eye    Neck pain, chronic    Decreased ROM of neck    Anxiety    Dizziness and giddiness    Mild cognitive disorder    Myopia    Olecranon bursitis of left elbow    Onychomycosis    Smokes tobacco daily    Traumatic injury    Major neurocognitive disorder (Multi)    Major neurocognitive disorder, due to Alzheimer's disease, without behavioral disturbance, mild (Multi)   [2]   Allergies  Allergen Reactions    Bee Venom Protein (Honey Bee) Unknown    Clindamycin Unknown    Escitalopram Oxalate Other     Fatigue    Iodinated Contrast Media Unknown    Iodine Unknown    Penicillins Unknown   [3]   Outpatient Encounter Medications as of 7/3/2025   Medication Sig Dispense Refill    aspirin 81 mg EC tablet Take 1 tablet (81 mg) by mouth once daily.      calcium carbonate-vitamin D3 600 mg-5 mcg (200 unit) tablet Take 2 tablets by mouth once daily.       cyanocobalamin (Vitamin B-12) 100 mcg tablet Take 1 tablet (100 mcg) by mouth once daily. 30 tablet 11    desoximetasone (Topicort) 0.25 % ointment Apply 1 Application topically twice a day. APPLY AND GENTLY MASSAGE INTO AFFECTED AREA(S) TWICE DAILY.      diclofenac sodium (Voltaren) 1 % gel Apply 4.5 inches (4 g) topically 4 times a day. 100 g 1    levothyroxine (Synthroid, Levoxyl) 100 mcg tablet Take 1 tablet (100 mcg) by mouth once daily. 90 tablet 3    lisinopril 10 mg tablet Take 1 tablet (10 mg) by mouth once daily. 90 tablet 0    mirtazapine (Remeron) 45 mg tablet Take 1 tablet (45 mg) by mouth once daily at bedtime. 30 tablet 3    rivastigmine (Exelon) 1.5 mg capsule Take 1 capsule (1.5 mg) by mouth 2 times a day. 60 capsule 3     No facility-administered encounter medications on file as of 7/3/2025.

## 2025-07-08 ENCOUNTER — APPOINTMENT (OUTPATIENT)
Dept: NEUROLOGY | Facility: CLINIC | Age: 85
End: 2025-07-08
Payer: MEDICARE

## 2025-07-08 VITALS
HEART RATE: 72 BPM | HEIGHT: 67 IN | SYSTOLIC BLOOD PRESSURE: 130 MMHG | DIASTOLIC BLOOD PRESSURE: 84 MMHG | TEMPERATURE: 96.2 F | BODY MASS INDEX: 14.55 KG/M2 | WEIGHT: 92.7 LBS

## 2025-07-08 DIAGNOSIS — G30.9 MAJOR NEUROCOGNITIVE DISORDER, DUE TO ALZHEIMER'S DISEASE, WITHOUT BEHAVIORAL DISTURBANCE, MILD (MULTI): Primary | ICD-10-CM

## 2025-07-08 DIAGNOSIS — F02.A0 MAJOR NEUROCOGNITIVE DISORDER, DUE TO ALZHEIMER'S DISEASE, WITHOUT BEHAVIORAL DISTURBANCE, MILD (MULTI): Primary | ICD-10-CM

## 2025-07-08 PROCEDURE — 1159F MED LIST DOCD IN RCRD: CPT | Performed by: STUDENT IN AN ORGANIZED HEALTH CARE EDUCATION/TRAINING PROGRAM

## 2025-07-08 PROCEDURE — G2211 COMPLEX E/M VISIT ADD ON: HCPCS | Performed by: STUDENT IN AN ORGANIZED HEALTH CARE EDUCATION/TRAINING PROGRAM

## 2025-07-08 PROCEDURE — 99204 OFFICE O/P NEW MOD 45 MIN: CPT | Performed by: STUDENT IN AN ORGANIZED HEALTH CARE EDUCATION/TRAINING PROGRAM

## 2025-07-08 PROCEDURE — 3079F DIAST BP 80-89 MM HG: CPT | Performed by: STUDENT IN AN ORGANIZED HEALTH CARE EDUCATION/TRAINING PROGRAM

## 2025-07-08 PROCEDURE — 3075F SYST BP GE 130 - 139MM HG: CPT | Performed by: STUDENT IN AN ORGANIZED HEALTH CARE EDUCATION/TRAINING PROGRAM

## 2025-07-08 PROCEDURE — 1160F RVW MEDS BY RX/DR IN RCRD: CPT | Performed by: STUDENT IN AN ORGANIZED HEALTH CARE EDUCATION/TRAINING PROGRAM

## 2025-07-08 RX ORDER — RIVASTIGMINE TARTRATE 3 MG/1
3 CAPSULE ORAL 2 TIMES DAILY
Qty: 180 CAPSULE | Refills: 3 | Status: SHIPPED | OUTPATIENT
Start: 2025-07-08 | End: 2026-07-08

## 2025-07-08 ASSESSMENT — PATIENT HEALTH QUESTIONNAIRE - PHQ9
2. FEELING DOWN, DEPRESSED OR HOPELESS: SEVERAL DAYS
SUM OF ALL RESPONSES TO PHQ9 QUESTIONS 1 & 2: 2
1. LITTLE INTEREST OR PLEASURE IN DOING THINGS: SEVERAL DAYS

## 2025-07-08 ASSESSMENT — LIFESTYLE VARIABLES
SKIP TO QUESTIONS 9-10: 1
AUDIT-C TOTAL SCORE: 1
HOW OFTEN DO YOU HAVE A DRINK CONTAINING ALCOHOL: MONTHLY OR LESS
HOW MANY STANDARD DRINKS CONTAINING ALCOHOL DO YOU HAVE ON A TYPICAL DAY: 1 OR 2
HOW OFTEN DO YOU HAVE SIX OR MORE DRINKS ON ONE OCCASION: NEVER

## 2025-07-08 NOTE — PROGRESS NOTES
"   [facility]   Referring: No ref. provider found  PCP: Alie Weiss MD  Chief Complaint   Patient presents with    Establish Care     Patient is here with her daughter to establish care for memory issues.  Patient is forgetful and has some depression.     Memory Loss            Subjective     Hafsa Wilson is a 85 y.o. year old female who presents for initial evaluation  memory loss.     She is accompanied to clinic by her daughter. They first noticed memory problems since about 2020 where she was a little more forgetful. She was having trouble operating a new phone. She lives in a [facility] in an [facility]. She is driving, only daytime hours. The furthest she will drive is to [place]. She is good with directions to her familiar places.     She has assistance with her finances from her daughter in [place]. Her daughter Serenity does her medications and if needed she can manage on her own. She has a stable appetite and weight. Serenity cooks for her and also makes a meal at her house once a week. She appears to eat the leftovers. She drinks a boost at least once per day. They have also tried to encourage her to increase her water intake.     She completed neuropsych testing with Dr. Mojica 11/12/2024 which \"History (which suggests a degree of cognitive decline that has begun to interfere with the ability to perform IADLs) and present results indicate a dementia (F03.91), with deficits in aspects of processing speed and executive functioning. \" Pattern of fronto-subcortical dysfunction.     She has been seeing Dr Zunilda Daniels where she was diagnosed with major neurocognitive disorder. MOCA at that time was 21/30 2/2024. B12 2/20/2024 was 225. She tried donepezil but had side effects of nightmares. She is currently on rivastigmine and mirtazapine. The rivastigmine has made her less foggy and more sharp but hard to say if she was sleeping better off of the donepezil. " "She is also on a vitamin B12 supplement 500mcg daily.     She has no family history of dementia.    She has a College degree, started at [employer] and finished at [employer] and has the equivalent of a Master's Degree in Education. 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 She is current smoker, limits to 4-7 cigarettes per day. She drinks alcohol rarely.      Problem List[1]  Medical History[2]  Surgical History[3]  Social History     Tobacco Use    Smoking status: Every Day     Current packs/day: 0.25     Types: Cigarettes    Smokeless tobacco: Never   Substance Use Topics    Alcohol use: Yes     Comment: rare     family history includes Cardiac disorder in her father; Heart attack in her father; Hyperlipidemia in her mother; Macular degeneration in her mother.  Current Medications[4]  Allergies[5]    Objective   Neurological Exam  Mental Status  Awake, alert and oriented to person, place and time. Speech is normal.  Oriented to \"doctor's office\" .    Cranial Nerves  CN III, IV, VI: Extraocular movements intact bilaterally.  CN VII: Full and symmetric facial movement.  CN VIII: Hearing is normal.    Motor   Strength is 5/5 throughout all four extremities.    Gait  Casual gait is normal including stance, stride, and arm swing.    Physical Exam  Eyes:      Extraocular Movements: Extraocular movements intact.   Neurological:      Motor: Motor strength is normal.  Psychiatric:         Speech: Speech normal.       Assessment/Plan   Assessment and Plan:   Diagnoses and all orders for this visit:  Major neurocognitive disorder, due to Alzheimer's disease, without behavioral disturbance, mild (Multi)  -     MR brain wo IV contrast; Future  -     rivastigmine (Exelon) 3 mg capsule; Take 1 capsule (3 mg) by mouth 2 times a day.    Dementia without behavioral disturbances: progressive cognitive decline over the past 5 years requiring assistance with cooking, finances and " medication administration. She is still driving, discussed OT driving evaluation vs family close monitoring and for now will have her daughter monitor her driving regularly. Recommend MRI brain without contrast. Consider amyloid PET CT scan. Discussed amyloid removal treatments, patient and daughter would like to hold off. Will increase rivastigmine to 3mg twice daily.     Follow-up in 6 months or sooner if needed    Patient Instructions   You were evaluated for your memory loss which is due to the dementia. The type of dementia is unclear.    We discussed further testing to evaluate your memory. I recommend an MRI of your brain. We also discussed considering an amyloid PET scan to look for the amyloid plaque that causes Alzheimer's dementia. However, this will not change your treatment but could give your children more information about your memory loss.    We discussed the amyloid removal treatments such as lecanemab and donanemab but they require IV infusions, regular MRIs and risk of microscopic brain bleeding and brain swelling. You and your daughter did not want to pursue this treatment.    I recommend increasing the rivastigmine to 3mg twice daily. I sent in a new prescription.           [1]   Patient Active Problem List  Diagnosis    Adenomatous polyp of colon    Anemia in stage 3a chronic kidney disease    Bilateral pseudophakia    Bilateral sensorineural hearing loss    Depression with anxiety    Early dry stage nonexudative age-related macular degeneration of both eyes    Epiretinal membrane (ERM) of right eye    Hearing loss of right ear    Hypermetropia of left eye    Hypothyroidism    Meibomian gland dysfunction (MGD) of upper and lower eyelid of left eye    Meibomian gland dysfunction (MGD) of upper and lower eyelid of right eye    Mild cognitive impairment    Myopia of both eyes    Myopic retinopathy of both eyes    Nevus    Nicotine dependence, unspecified, uncomplicated    Oral mucosal lesion     Osteopenia    Weight loss    Primary hypertension    Pseudophakia of left eye    Pseudophakia of right eye    Neck pain, chronic    Decreased ROM of neck    Anxiety    Dizziness and giddiness    Mild cognitive disorder    Myopia    Olecranon bursitis of left elbow    Onychomycosis    Smokes tobacco daily    Traumatic injury    Major neurocognitive disorder (Multi)    Major neurocognitive disorder, due to Alzheimer's disease, without behavioral disturbance, mild (Multi)   [2]   Past Medical History:  Diagnosis Date    Abdominal distension (gaseous) 03/12/2013    Abdominal distention    Cataract     Contusion of left lower leg, initial encounter 07/30/2015    Contusion of leg, left    Displacement of intraocular lens, initial encounter 10/17/2022    Dislocated intraocular lens    Encounter for examination of eyes and vision without abnormal findings     Encounter for ophthalmic examination and evaluation    Encounter for gynecological examination (general) (routine) without abnormal findings     Encounter for gynecological examination without abnormal finding    Encounter for screening for cardiovascular disorders     Encounter for screening for cardiovascular disorders    Epiretinal membrane (ERM) of right eye     Impacted cerumen, right ear 04/16/2021    Impacted cerumen of right ear    Ingrowing nail 04/26/2021    Ingrown toenail    Macular degeneration     Macular RPE mottling     Nail dystrophy 07/25/2018    Dystrophic nail    Other conditions influencing health status     X-Ray    Other conditions influencing health status 11/08/2013    Foot pain, unspecified laterality    Other specified anxiety disorders     Depression with anxiety    Pain in left toe(s) 04/26/2021    Chronic pain of toe of left foot    Pain in unspecified knee     Joint pain, knee    Palpitations     Palpitations    Personal history of other diseases of the circulatory system     History of hypertension    Personal history of other diseases  of the musculoskeletal system and connective tissue 09/16/2021    History of neck pain    Personal history of other diseases of the musculoskeletal system and connective tissue     History of osteopenia    Personal history of other diseases of the respiratory system 09/21/2015    History of acute bronchitis    Personal history of other endocrine, nutritional and metabolic disease     History of hypothyroidism    Personal history of other infectious and parasitic diseases     History of herpes zoster    Personal history of other infectious and parasitic diseases 04/26/2021    History of onychomycosis    Personal history of other specified conditions     History of epistaxis    Personal history of other specified conditions     History of fatigue    Personal history of other specified conditions 11/01/2018    History of breast lump    Rash and other nonspecific skin eruption 09/04/2018    Rash    Supraventricular tachycardia     Supraventricular tachycardia    Syncope and collapse 03/08/2017    Syncope and collapse    Toxic effect of venom of bees, accidental (unintentional), initial encounter 07/21/2018    Bee sting    Unspecified abnormal findings in urine     Malodorous urine   [3]   Past Surgical History:  Procedure Laterality Date    CATARACT EXTRACTION      COLONOSCOPY  12/20/2012    Complete Colonoscopy    OTHER SURGICAL HISTORY  12/20/2012    Superficial Cauterization Of Turbinate Mucosa   [4]   Current Outpatient Medications:     aspirin 81 mg EC tablet, Take 1 tablet (81 mg) by mouth once daily., Disp: , Rfl:     calcium carbonate-vitamin D3 600 mg-5 mcg (200 unit) tablet, Take 2 tablets by mouth once daily., Disp: , Rfl:     cyanocobalamin (Vitamin B-12) 100 mcg tablet, Take 1 tablet (100 mcg) by mouth once daily., Disp: 30 tablet, Rfl: 11    desoximetasone (Topicort) 0.25 % ointment, Apply 1 Application topically twice a day. APPLY AND GENTLY MASSAGE INTO AFFECTED AREA(S) TWICE DAILY., Disp: , Rfl:      levothyroxine (Synthroid, Levoxyl) 100 mcg tablet, Take 1 tablet (100 mcg) by mouth once daily., Disp: 90 tablet, Rfl: 3    lisinopril 10 mg tablet, Take 1 tablet (10 mg) by mouth once daily., Disp: 90 tablet, Rfl: 0    mirtazapine (Remeron) 45 mg tablet, Take 1 tablet (45 mg) by mouth once daily at bedtime., Disp: 30 tablet, Rfl: 3    rivastigmine (Exelon) 3 mg capsule, Take 1 capsule (3 mg) by mouth 2 times a day., Disp: 180 capsule, Rfl: 3  [5]   Allergies  Allergen Reactions    Bee Venom Protein (Honey Bee) Unknown    Clindamycin Unknown    Escitalopram Oxalate Other     Fatigue    Iodinated Contrast Media Unknown    Iodine Unknown    Penicillins Unknown

## 2025-07-08 NOTE — PATIENT INSTRUCTIONS
You were evaluated for your memory loss which is due to the dementia. The type of dementia is unclear.    We discussed further testing to evaluate your memory. I recommend an MRI of your brain. We also discussed considering an amyloid PET scan to look for the amyloid plaque that causes Alzheimer's dementia. However, this will not change your treatment but could give your children more information about your memory loss.    We discussed the amyloid removal treatments such as lecanemab and donanemab but they require IV infusions, regular MRIs and risk of microscopic brain bleeding and brain swelling. You and your daughter did not want to pursue this treatment.    I recommend increasing the rivastigmine to 3mg twice daily. I sent in a new prescription to your pharmacy for a 90 day supply.     We also discussed your safety in regards to driving. I recommend your daughter monitor your driving closely. If there is further concern, then you can have an Occupational Therapy driving evaluation.    Follow-up in 6 months or sooner if needed

## 2025-07-09 ENCOUNTER — TELEPHONE (OUTPATIENT)
Dept: NEUROLOGY | Facility: CLINIC | Age: 85
End: 2025-07-09
Payer: MEDICARE

## 2025-07-09 NOTE — TELEPHONE ENCOUNTER
Patient's daughter called and is concerned about her Mother.  She said that her Mother took the Rivastigmine that was increased to 3 mg and she came to see her Mother today and she was laying on the kitchen floor. She said that she was afraid of falling because she felt so hot and felt very thirsty.  Her daughter also mentioned that she had a real bad nightmare last night as well.  Suggestions?

## 2025-07-10 ENCOUNTER — APPOINTMENT (OUTPATIENT)
Dept: PRIMARY CARE | Facility: CLINIC | Age: 85
End: 2025-07-10
Payer: MEDICARE

## 2025-07-10 VITALS
SYSTOLIC BLOOD PRESSURE: 139 MMHG | BODY MASS INDEX: 14.16 KG/M2 | HEART RATE: 62 BPM | TEMPERATURE: 96.9 F | HEIGHT: 67 IN | OXYGEN SATURATION: 95 % | WEIGHT: 90.2 LBS | DIASTOLIC BLOOD PRESSURE: 82 MMHG

## 2025-07-10 DIAGNOSIS — F09 MILD COGNITIVE DISORDER: ICD-10-CM

## 2025-07-10 DIAGNOSIS — I10 PRIMARY HYPERTENSION: Primary | ICD-10-CM

## 2025-07-10 PROCEDURE — 3079F DIAST BP 80-89 MM HG: CPT | Performed by: INTERNAL MEDICINE

## 2025-07-10 PROCEDURE — 3075F SYST BP GE 130 - 139MM HG: CPT | Performed by: INTERNAL MEDICINE

## 2025-07-10 PROCEDURE — 99214 OFFICE O/P EST MOD 30 MIN: CPT | Performed by: INTERNAL MEDICINE

## 2025-07-10 PROCEDURE — 1159F MED LIST DOCD IN RCRD: CPT | Performed by: INTERNAL MEDICINE

## 2025-07-10 PROCEDURE — G2211 COMPLEX E/M VISIT ADD ON: HCPCS | Performed by: INTERNAL MEDICINE

## 2025-07-10 PROCEDURE — 1160F RVW MEDS BY RX/DR IN RCRD: CPT | Performed by: INTERNAL MEDICINE

## 2025-07-10 ASSESSMENT — PATIENT HEALTH QUESTIONNAIRE - PHQ9
1. LITTLE INTEREST OR PLEASURE IN DOING THINGS: NOT AT ALL
SUM OF ALL RESPONSES TO PHQ9 QUESTIONS 1 AND 2: 0
2. FEELING DOWN, DEPRESSED OR HOPELESS: NOT AT ALL

## 2025-07-10 NOTE — PROGRESS NOTES
Subjective   Patient ID: Hafsa Wilson is a 85 y.o. female who presents for Follow-up.  HPI  History of Present Illness  The patient is an 85-year-old female who presents for evaluation of cognitive issues and weight loss.    Cognitive Issues  - Her daughter notes that her condition has changed significantly over the past week, with the last two days being particularly challenging.  - She had a severe reaction to rivastigmine, which led to a reduction in dosage to 1.5 mg.  - She has not taken the medication since yesterday morning.  - Her daughter suspects that her current state may be a side effect of the rivastigmine, as she seemed less foggy when off the medication.  - She is considering a break from the medication for a week or two to observe her response before potentially resuming the 1.5 mg dose.    Weight Loss  - She did not eat much yesterday due to a poor day, only consuming breakfast.  - She experienced dry heaving, which was managed with tea and rest.  - She skipped breakfast this morning, anticipating fasting for blood work.  - A high-calorie diet is planned for today.    Eye Sensation  - She mentions an unusual sensation in her right eye but does not report any blurriness or double vision.    Incident After Drug [place] Visit  - She recalls an incident where she felt extremely hot after returning from [facility] [employer], leading her to lie on the floor.  - By the time her daughter arrived, she was in bed, feeling cold and uninterested in eating or drinking.  - She also experienced dry heaving during a phone call with her daughter.        She has not taken any medication since last night and did not take her morning dose today.       Review of Systems   Constitutional:  Negative for activity change and appetite change.   Respiratory:  Negative for cough and shortness of breath.    Cardiovascular:  Negative for chest pain, palpitations and leg swelling.     Objective   Visit Vitals  /82   Pulse 62   Temp  "36.1 °C (96.9 °F)   Ht 1.689 m (5' 6.5\")   Wt (!) 40.9 kg (90 lb 3.2 oz)   SpO2 95%   BMI 14.34 kg/m²   Smoking Status Every Day   BSA 1.39 m²      Patient Health Questionnaire-2 Score: 0 (7/10/2025 11:11 AM)     Physical Exam  Vitals and nursing note reviewed.   Cardiovascular:      Rate and Rhythm: Normal rate and regular rhythm.   Pulmonary:      Effort: Pulmonary effort is normal.      Breath sounds: Normal breath sounds.     Results       Assessment & Plan  1. Cognitive issues  - Symptoms likely due to rivastigmine, causing more harm than benefit  - Physical exam: difficulty reading, fogginess, and impaired judgment  - Discussed discontinuation of rivastigmine and monitoring condition over the next few days  - Advised to stop rivastigmine and driving due to impaired judgment and reflexes  - Unlikely CVA - has MRI scheduled for next week  Messaged Dr Acosta and she agrees with discontinue Rivastigmene for now    2. Weight loss  - Weight decreased to 90 pounds  - Did not eat much yesterday due to dry heaving and confusion about fasting for blood work  - High-calorie diet recommended to help regain weight  - No additional blood work required at this time     3. Depresiion - on Remeron - sees psychiatry  1. Primary hypertension        2. Mild cognitive disorder          Follow up with me in 2 weeks  Alie Weiss MD   This medical note was created with the assistance of artificial intelligence (AI) for documentation purposes. The content has been reviewed and confirmed by the healthcare provider for accuracy and completeness. Patient consented to the use of audio recording and use of AI during their visit.      "

## 2025-07-18 ENCOUNTER — APPOINTMENT (OUTPATIENT)
Dept: RADIOLOGY | Facility: HOSPITAL | Age: 85
End: 2025-07-18
Payer: MEDICARE

## 2025-07-18 DIAGNOSIS — F02.A0 MAJOR NEUROCOGNITIVE DISORDER, DUE TO ALZHEIMER'S DISEASE, WITHOUT BEHAVIORAL DISTURBANCE, MILD (MULTI): ICD-10-CM

## 2025-07-18 DIAGNOSIS — G30.9 MAJOR NEUROCOGNITIVE DISORDER, DUE TO ALZHEIMER'S DISEASE, WITHOUT BEHAVIORAL DISTURBANCE, MILD (MULTI): ICD-10-CM

## 2025-07-18 PROCEDURE — 70551 MRI BRAIN STEM W/O DYE: CPT

## 2025-07-23 ENCOUNTER — APPOINTMENT (OUTPATIENT)
Dept: PRIMARY CARE | Facility: CLINIC | Age: 85
End: 2025-07-23
Payer: MEDICARE

## 2025-07-23 VITALS
DIASTOLIC BLOOD PRESSURE: 69 MMHG | HEART RATE: 63 BPM | OXYGEN SATURATION: 95 % | HEIGHT: 67 IN | SYSTOLIC BLOOD PRESSURE: 139 MMHG | BODY MASS INDEX: 14.34 KG/M2 | TEMPERATURE: 97.2 F

## 2025-07-23 DIAGNOSIS — G30.9 MAJOR NEUROCOGNITIVE DISORDER, DUE TO ALZHEIMER'S DISEASE, WITHOUT BEHAVIORAL DISTURBANCE, MILD (MULTI): ICD-10-CM

## 2025-07-23 DIAGNOSIS — F41.8 DEPRESSION WITH ANXIETY: ICD-10-CM

## 2025-07-23 DIAGNOSIS — F02.A0 MAJOR NEUROCOGNITIVE DISORDER, DUE TO ALZHEIMER'S DISEASE, WITHOUT BEHAVIORAL DISTURBANCE, MILD (MULTI): ICD-10-CM

## 2025-07-23 DIAGNOSIS — L60.1 ONYCHOLYSIS: Primary | ICD-10-CM

## 2025-07-23 PROCEDURE — 3075F SYST BP GE 130 - 139MM HG: CPT | Performed by: INTERNAL MEDICINE

## 2025-07-23 PROCEDURE — 1160F RVW MEDS BY RX/DR IN RCRD: CPT | Performed by: INTERNAL MEDICINE

## 2025-07-23 PROCEDURE — G2211 COMPLEX E/M VISIT ADD ON: HCPCS | Performed by: INTERNAL MEDICINE

## 2025-07-23 PROCEDURE — 3078F DIAST BP <80 MM HG: CPT | Performed by: INTERNAL MEDICINE

## 2025-07-23 PROCEDURE — 1159F MED LIST DOCD IN RCRD: CPT | Performed by: INTERNAL MEDICINE

## 2025-07-23 PROCEDURE — 99214 OFFICE O/P EST MOD 30 MIN: CPT | Performed by: INTERNAL MEDICINE

## 2025-07-23 NOTE — PROGRESS NOTES
"Subjective   Patient ID: Hafsa Wilson is a 85 y.o. female who presents for Follow-up.  HPI  History of Present Illness  The patient presents for evaluation of dementia, left foot pain, and hypertension.    Left Foot Pain  - She reports experiencing pain in her left foot - under her right first digit nail bed  No recent pedicures (even though nails are painted)    Dementia  - Her Exelon (rivastigmine) medication was discontinued due to side effects.  - She finds it challenging to assess her own condition as she spends most of her time alone.  - After switching from a previous medication, she seemed less confused, as noted by her sister-in-law and others.  - She had a similar adverse reaction to Aricept within the first 24 days of starting it, which included an inability to dress herself, memory issues, hallucinations, and nightmares.  - This reaction occurred in January 2025.  - The medication was then discontinued, and her psychiatrist started her on rivastigmine.  - Dr. Shetty later increased the dose, leading to further adverse effects.  - She only had 2 doses of the 3 mg, but she did well on 1.5 mg for a few months.  - An MRI was performed to rule out a stroke, and it showed age-related changes.    Hypertension  - She is on lisinopril for her high blood pressure.    - She has driven short distances since the onset of her symptoms but not frequently.  - She has stopped driving at night due to vision issues and only drives during certain hours.       Review of Systems   Constitutional:  Negative for activity change and appetite change.   Respiratory:  Negative for cough and shortness of breath.    Cardiovascular:  Negative for chest pain, palpitations and leg swelling.     Objective   Visit Vitals  /69   Pulse 63   Temp 36.2 °C (97.2 °F)   Ht 1.689 m (5' 6.5\")   SpO2 95%   BMI 14.34 kg/m²   Smoking Status Every Day   BSA 1.39 m²      Patient Health Questionnaire-2 Score: 0 (7/23/2025  1:13 PM) "     Physical Exam  Vitals and nursing note reviewed.   Cardiovascular:      Rate and Rhythm: Normal rate and regular rhythm.   Pulmonary:      Effort: Pulmonary effort is normal.      Breath sounds: Normal breath sounds.   MS - back to baseline - able to read sign fluently; more engaged; a little less confused  Results  - Imaging:    - MRI of the brain: Age-related changes, possibly indicating vascular dementia. Evidence of old blood products, possibly from a past fall.     Assessment & Plan  1. Left foot pain  - Referral to a podiatrist, for further evaluation and management.    2. Dementia  - Appears to be age-related, potentially indicative of vascular dementia.  - MRI results do not provide any additional diagnostic information.  - Experienced adverse reactions to both donepezil and rivastigmine, including confusion, hallucinations, and weight loss.  - Referral for an official driving test through occupational therapy to assess ability to drive safely.  - Remain off Exelon (rivastigmine) until further consultation with psychiatrist, , in October 2025.    3. Hypertension  - Currently being managed with lisinopril.     1. Onycholysis  Referral to Podiatry      2. Depression with anxiety        3. Major neurocognitive disorder, due to Alzheimer's disease, without behavioral disturbance, mild (Multi)  Referral to Occupational Therapy          Alie Weiss MD   This medical note was created with the assistance of artificial intelligence (AI) for documentation purposes. The content has been reviewed and confirmed by the healthcare provider for accuracy and completeness. Patient consented to the use of audio recording and use of AI during their visit.

## 2025-08-08 ENCOUNTER — TELEPHONE (OUTPATIENT)
Dept: NEUROLOGY | Facility: CLINIC | Age: 85
End: 2025-08-08
Payer: MEDICARE

## 2025-08-08 DIAGNOSIS — G30.9 MAJOR NEUROCOGNITIVE DISORDER, DUE TO ALZHEIMER'S DISEASE, WITHOUT BEHAVIORAL DISTURBANCE, MILD (MULTI): Primary | ICD-10-CM

## 2025-08-08 DIAGNOSIS — F02.A0 MAJOR NEUROCOGNITIVE DISORDER, DUE TO ALZHEIMER'S DISEASE, WITHOUT BEHAVIORAL DISTURBANCE, MILD (MULTI): Primary | ICD-10-CM

## 2025-08-08 PROBLEM — G31.84 MILD COGNITIVE IMPAIRMENT: Status: RESOLVED | Noted: 2023-02-26 | Resolved: 2025-08-08

## 2025-08-08 PROBLEM — F09 MILD COGNITIVE DISORDER: Status: RESOLVED | Noted: 2024-01-26 | Resolved: 2025-08-08

## 2025-08-08 PROBLEM — F03.90 MAJOR NEUROCOGNITIVE DISORDER (MULTI): Status: RESOLVED | Noted: 2025-02-03 | Resolved: 2025-08-08

## 2025-08-08 NOTE — TELEPHONE ENCOUNTER
Reviewed results of MRI  Returned phone call to patient's daughter. Moderate atrophy and small vessel ischemic disease. 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 She drives infrequently and less than 1 mile   She has referral for OT driving evaluation by Dr. Weiss   One additional consideration is to have her retake the driving written and on road exam   Recommend she has some form of formal driving evaluation     I called patient and spoke with her, discussed results of the MRI. Informed her concern of early stage dementia. Discussed concerns re: driving and recommendation of driving evaluation

## 2025-08-09 DIAGNOSIS — I10 PRIMARY HYPERTENSION: ICD-10-CM

## 2025-08-10 RX ORDER — LISINOPRIL 10 MG/1
10 TABLET ORAL DAILY
Qty: 90 TABLET | Refills: 0 | Status: SHIPPED | OUTPATIENT
Start: 2025-08-10

## 2025-08-20 ENCOUNTER — APPOINTMENT (OUTPATIENT)
Dept: NEUROLOGY | Facility: CLINIC | Age: 85
End: 2025-08-20
Payer: MEDICARE

## 2025-10-07 ENCOUNTER — APPOINTMENT (OUTPATIENT)
Dept: BEHAVIORAL HEALTH | Facility: CLINIC | Age: 85
End: 2025-10-07
Payer: MEDICARE

## 2025-10-16 ENCOUNTER — APPOINTMENT (OUTPATIENT)
Dept: PRIMARY CARE | Facility: CLINIC | Age: 85
End: 2025-10-16
Payer: MEDICARE

## 2025-10-29 ENCOUNTER — APPOINTMENT (OUTPATIENT)
Dept: OPHTHALMOLOGY | Facility: CLINIC | Age: 85
End: 2025-10-29
Payer: MEDICARE

## 2026-01-22 ENCOUNTER — APPOINTMENT (OUTPATIENT)
Dept: PRIMARY CARE | Facility: CLINIC | Age: 86
End: 2026-01-22
Payer: MEDICARE

## 2026-02-04 ENCOUNTER — APPOINTMENT (OUTPATIENT)
Dept: OPHTHALMOLOGY | Facility: CLINIC | Age: 86
End: 2026-02-04
Payer: MEDICARE

## 2026-02-12 ENCOUNTER — APPOINTMENT (OUTPATIENT)
Dept: NEUROLOGY | Facility: CLINIC | Age: 86
End: 2026-02-12
Payer: MEDICARE